# Patient Record
Sex: MALE | Race: WHITE | Employment: OTHER | URBAN - METROPOLITAN AREA
[De-identification: names, ages, dates, MRNs, and addresses within clinical notes are randomized per-mention and may not be internally consistent; named-entity substitution may affect disease eponyms.]

---

## 2017-01-26 ENCOUNTER — OFFICE VISIT (OUTPATIENT)
Dept: ORTHOPEDICS | Facility: CLINIC | Age: 67
End: 2017-01-26
Payer: COMMERCIAL

## 2017-01-26 VITALS — WEIGHT: 193 LBS | HEIGHT: 71 IN | BODY MASS INDEX: 27.02 KG/M2 | TEMPERATURE: 97.9 F

## 2017-01-26 DIAGNOSIS — Z98.890 S/P ARTHROSCOPY OF RIGHT KNEE: Primary | ICD-10-CM

## 2017-01-26 DIAGNOSIS — S83.241A TEAR OF MEDIAL MENISCUS OF RIGHT KNEE, CURRENT, UNSPECIFIED TEAR TYPE, INITIAL ENCOUNTER: ICD-10-CM

## 2017-01-26 PROCEDURE — 99024 POSTOP FOLLOW-UP VISIT: CPT | Performed by: PHYSICIAN ASSISTANT

## 2017-01-26 ASSESSMENT — PAIN SCALES - GENERAL: PAINLEVEL: NO PAIN (1)

## 2017-01-26 NOTE — MR AVS SNAPSHOT
"              After Visit Summary   2017    Aung Sidhu    MRN: [id number]           Patient Information     Date Of Birth          1950        Visit Information        Provider Department      2017 8:00 AM Greg Morgan MD [facility]         Follow-ups after your visit        Who to contact     If you have questions or need follow up information about today's clinic visit or your schedule please contact [facility] directly at [phone].  Normal or non-critical lab and imaging results will be communicated to you by Strikinglyhart, letter or phone within 4 business days after the clinic has received the results. If you do not hear from us within 7 days, please contact the clinic through Strikinglyhart or phone. If you have a critical or abnormal lab result, we will notify you by phone as soon as possible.  Submit refill requests through [employer] or call your pharmacy and they will forward the refill request to us. Please allow 3 business days for your refill to be completed.          Additional Information About Your Visit        [employer] Information     [employer] lets you send messages to your doctor, view your test results, renew your prescriptions, schedule appointments and more. To sign up, go to [email or web address] . Click on \"Log in\" on the left side of the screen, which will take you to the Welcome page. Then click on \"Sign up Now\" on the right side of the page.     You will be asked to enter the access code listed below, as well as some personal information. Please follow the directions to create your username and password.     Your access code is: [email or web address]  Expires: 3/6/2017  9:14 AM     Your access code will  in 90 days. If you need help or a new code, please call your [facility] or [phone].        Care EveryWhere ID     This is your Care EveryWhere ID. This could be used by other organizations to access your [place] medical " "records  [id number]        Your Vitals Were     Temperature Height BMI (Body Mass Index)             97.9  F (36.6  C) (Temporal) 1.806 m (5' 11.1\") 26.84 kg/m2          Blood Pressure from Last 3 Encounters:   12/15/16 135/95   12/12/16 130/80   11/25/16 122/82    Weight from Last 3 Encounters:   01/26/17 87.544 kg (193 lb)   12/27/16 84.823 kg (187 lb)   12/15/16 83.915 kg (185 lb)              Today, you had the following     No orders found for display       Primary Care Provider Office Phone # Fax #    Delfino Ferguson [phone]       [facility] [address] [place] [address]        Thank you!     Thank you for choosing [facility]  for your care. Our goal is always to provide you with excellent care. Hearing back from our patients is one way we can continue to improve our services. Please take a few minutes to complete the written survey that you may receive in the mail after your visit with us. Thank you!             Your Updated Medication List - Protect others around you: Learn how to safely use, store and throw away your medicines at www.disposemymeds.org.          This list is accurate as of: 1/26/17  8:01 AM.  Always use your most recent med list.                   Brand Name Dispense Instructions for use    augmented betamethasone dipropionate 0.05 % cream    DIPROLENE-AF    50 g    Apply sparingly to affected area twice daily as needed.  Do not apply to face.       lisinopril 10 MG tablet    PRINIVIL/ZESTRIL    90 tablet    Take 1 tablet (10 mg) by mouth daily         "

## 2017-01-26 NOTE — PROGRESS NOTES
"HISTORY OF PRESENT ILLNESS:    Aung Sidhu is a 66 year old male who is seen in follow up for   Chief Complaint   Patient presents with     RECHECK     Right  knee arthroscopy with medial meniscus debridement.  DOS: 12/15/16 (6 weeks postop)     Surgical Followup     PREOPERATIVE DIAGNOSIS:Degenerative tear medial meniscus right knee.POSTOPERATIVE DIAGNOSIS:Degenerative tear medial meniscus, right knee with fairly significant patellofemoral chondromalacia, defect of the medial femoral condyle along its medial edge, evidence of tear of the anterior horn lateral meniscus with an entrapped loose body.PROCEDURE:Arthroscopic evaluation with debridement of medial meniscus and assessment of anterior horn of the lateral meniscus with removal loose body and smoothing of the      Surgical Followup     cartilage.Chondral shaving   Interval: Patient reports his knee is getting better with every day. He has been getting around well.  He does describe if he does too much she gets a little additional soreness. Swelling has been minimal.  Patient evaluation done with Dr. Morgan    Physical Exam:  Vitals: Temp(Src) 97.9  F (36.6  C) (Temporal)  Ht 1.806 m (5' 11.1\")  Wt 87.544 kg (193 lb)  BMI 26.84 kg/m2  BMI= Body mass index is 26.84 kg/(m^2).  Constitutional: healthy, alert and no acute distress   Psychiatric: mentation appears normal and affect normal/bright  NEURO: no focal deficits  Location of surgery: right knee  Swelling minimal  Incision sites:  Well-healed  Range of motion: Full extension and full flexion  Quad tone is decreased in the right comparatively to the left. This was pointed out to the patient.     ASSESSMENT:    Chief Complaint   Patient presents with     RECHECK     Right  knee arthroscopy with medial meniscus debridement.  DOS: 12/15/16 (6 weeks postop)     Surgical Followup     PREOPERATIVE DIAGNOSIS:Degenerative tear medial meniscus right knee.POSTOPERATIVE DIAGNOSIS:Degenerative tear medial meniscus, " right knee with fairly significant patellofemoral chondromalacia, defect of the medial femoral condyle along its medial edge, evidence of tear of the anterior horn lateral meniscus with an entrapped loose body.PROCEDURE:Arthroscopic evaluation with debridement of medial meniscus and assessment of anterior horn of the lateral meniscus with removal loose body and smoothing of the      Surgical Followup     cartilage.Chondral shaving       ICD-10-CM    1. S/P arthroscopy of right knee Z98.890    2. Tear of medial meniscus of right knee, current, unspecified tear type, initial encounter S83.241A      Patient is 6 weeks status post right medial And lateral meniscus tear debridement.  Patient is doing well. He does have decreased strength in the right compared to the plate to the left.    Plan:   Physical Therapy: The discrepancy of quad tone was pointed out to the patient. Patient is shown how to do home exercises of 1 leg squats to help  Alleviate the symptom.Patient states core buttocks and thigh exercises concentrated on the leg is what is needed.  Patient advised to utilize continued compression if needed or elevation above the heart level as needed. He has not had too many concerns with this.  Return to clinic As needed for concerns    Danisha Patten PA-C   1/26/2017  9:57 AM      I attest I have seen and evaluated the patient.  I agree with above impression and plan.    Greg Morgan MD

## 2017-01-26 NOTE — NURSING NOTE
"Chief Complaint   Patient presents with     RECHECK     Right  knee arthroscopy with medial meniscus debridement.  DOS: 12/15/16 (6 weeks postop)     Surgical Followup     PREOPERATIVE DIAGNOSIS:Degenerative tear medial meniscus right knee.POSTOPERATIVE DIAGNOSIS:Degenerative tear medial meniscus, right knee with fairly significant patellofemoral chondromalacia, defect of the medial femoral condyle along its medial edge, evidence of tear of the anterior horn lateral meniscus with an entrapped loose body.PROCEDURE:Arthroscopic evaluation with debridement of medial meniscus and assessment of anterior horn of the lateral meniscus with removal loose body and smoothing of the      Surgical Followup     cartilage.Chondral shaving       Initial Ht 1.806 m (5' 11.1\")  Wt 87.544 kg (193 lb)  BMI 26.84 kg/m2 Estimated body mass index is 26.84 kg/(m^2) as calculated from the following:    Height as of this encounter: 1.806 m (5' 11.1\").    Weight as of this encounter: 87.544 kg (193 lb).  BP completed using cuff size: NA (Not Taken)  MIL Valiente  "

## 2017-03-30 ENCOUNTER — RADIANT APPOINTMENT (OUTPATIENT)
Dept: GENERAL RADIOLOGY | Facility: CLINIC | Age: 67
End: 2017-03-30
Attending: PHYSICIAN ASSISTANT
Payer: COMMERCIAL

## 2017-03-30 ENCOUNTER — OFFICE VISIT (OUTPATIENT)
Dept: FAMILY MEDICINE | Facility: CLINIC | Age: 67
End: 2017-03-30
Payer: COMMERCIAL

## 2017-03-30 VITALS
WEIGHT: 187.8 LBS | BODY MASS INDEX: 26.12 KG/M2 | HEART RATE: 72 BPM | TEMPERATURE: 97.1 F | SYSTOLIC BLOOD PRESSURE: 128 MMHG | DIASTOLIC BLOOD PRESSURE: 80 MMHG | OXYGEN SATURATION: 100 %

## 2017-03-30 DIAGNOSIS — Z11.59 NEED FOR HEPATITIS C SCREENING TEST: ICD-10-CM

## 2017-03-30 DIAGNOSIS — J20.9 ACUTE BRONCHITIS, UNSPECIFIED ORGANISM: ICD-10-CM

## 2017-03-30 DIAGNOSIS — Z23 NEED FOR VACCINATION: ICD-10-CM

## 2017-03-30 DIAGNOSIS — Z71.89 COUNSELING REGARDING ADVANCED DIRECTIVES: ICD-10-CM

## 2017-03-30 DIAGNOSIS — R05.9 COUGH: ICD-10-CM

## 2017-03-30 DIAGNOSIS — R05.9 COUGH: Primary | ICD-10-CM

## 2017-03-30 DIAGNOSIS — C61 MALIGNANT NEOPLASM OF PROSTATE (H): ICD-10-CM

## 2017-03-30 DIAGNOSIS — I10 BENIGN ESSENTIAL HYPERTENSION: ICD-10-CM

## 2017-03-30 PROCEDURE — 90670 PCV13 VACCINE IM: CPT | Performed by: PHYSICIAN ASSISTANT

## 2017-03-30 PROCEDURE — G0009 ADMIN PNEUMOCOCCAL VACCINE: HCPCS | Performed by: PHYSICIAN ASSISTANT

## 2017-03-30 PROCEDURE — 86803 HEPATITIS C AB TEST: CPT | Performed by: PHYSICIAN ASSISTANT

## 2017-03-30 PROCEDURE — 36415 COLL VENOUS BLD VENIPUNCTURE: CPT | Performed by: PHYSICIAN ASSISTANT

## 2017-03-30 PROCEDURE — 71020 XR CHEST 2 VW: CPT

## 2017-03-30 PROCEDURE — 99214 OFFICE O/P EST MOD 30 MIN: CPT | Mod: 25 | Performed by: PHYSICIAN ASSISTANT

## 2017-03-30 RX ORDER — AZITHROMYCIN 250 MG/1
TABLET, FILM COATED ORAL
Qty: 6 TABLET | Refills: 0 | Status: SHIPPED | OUTPATIENT
Start: 2017-03-30 | End: 2017-11-03

## 2017-03-30 NOTE — NURSING NOTE
Screening Questionnaire for Adult Immunization    Are you sick today?   No   Do you have allergies to medications, food, a vaccine component or latex?   No   Have you ever had a serious reaction after receiving a vaccination?   No   Do you have a long-term health problem with heart disease, lung disease, asthma, kidney disease, metabolic disease (e.g. diabetes), anemia, or other blood disorder?   No   Do you have cancer, leukemia, HIV/AIDS, or any other immune system problem?   No   In the past 3 months, have you taken medications that affect  your immune system, such as prednisone, other steroids, or anticancer drugs; drugs for the treatment of rheumatoid arthritis, Crohn s disease, or psoriasis; or have you had radiation treatments?   No   Have you had a seizure, or a brain or other nervous system problem?   No   During the past year, have you received a transfusion of blood or blood     products, or been given immune (gamma) globulin or antiviral drug?   No   For women: Are you pregnant or is there a chance you could become        pregnant during the next month?   No   Have you received any vaccinations in the past 4 weeks?   No     Immunization questionnaire answers were all negative.      MNVFC doesn't apply on this patient    Per orders of standing orders, injection of Prevnar given by Ramya Santiago. Patient instructed to remain in clinic for 20 minutes afterwards, and to report any adverse reaction to me immediately.       Screening performed by Ramya Santiago on 3/30/2017 at 11:07 AM.

## 2017-03-30 NOTE — PROGRESS NOTES
Please send the following letter to the patient:    Aung,    Your chest x-ray is normal.    Please call me with any questions or concerns.          Mariama Pitt PA-C

## 2017-03-30 NOTE — LETTER
[facility]  [address] [address] [address]  [phone]        March 31, 2017      Aung Sidhu  [address] [address] [address]        Aung,    Your hepatitis C screen is negative.      Results for orders placed or performed in visit on 03/30/17   **Hepatitis C Screen Reflex to RNA FUTURE anytime   Result Value Ref Range    Hepatitis C Antibody  NR     Nonreactive   Assay performance characteristics have not been established for newborns,   infants, and children           Please call me with any questions or concerns.          Mariama Pitt PA-C/caroline

## 2017-03-30 NOTE — NURSING NOTE
"Chief Complaint   Patient presents with     Throat Problem       Initial BP (!) 156/96  Pulse 72  Temp 97.1  F (36.2  C) (Oral)  Wt 187 lb 12.8 oz (85.2 kg)  SpO2 100%  BMI 26.12 kg/m2 Estimated body mass index is 26.12 kg/(m^2) as calculated from the following:    Height as of 1/26/17: 5' 11.1\" (1.806 m).    Weight as of this encounter: 187 lb 12.8 oz (85.2 kg).  Medication Reconciliation: complete   Ramya Santiago MA      "

## 2017-03-30 NOTE — MR AVS SNAPSHOT
"              After Visit Summary   3/30/2017    Aung Sidhu    MRN: [id number]           Patient Information     Date Of Birth          1950        Visit Information        Provider Department      3/30/2017 10:20 AM Mariama Pitt PA-C [facility]        Today's Diagnoses     Cough    -  1    Counseling regarding advanced directives        Need for hepatitis C screening test        Need for vaccination        Acute bronchitis, unspecified organism        Malignant neoplasm of prostate (H)        Benign essential hypertension           Follow-ups after your visit        Who to contact     Normal or non-critical lab and imaging results will be communicated to you by [employer], letter or phone within 4 business days after the clinic has received the results. If you do not hear from us within 7 days, please contact the clinic through [employer] or phone. If you have a critical or abnormal lab result, we will notify you by phone as soon as possible.  Submit refill requests through [employer] or call your pharmacy and they will forward the refill request to us. Please allow 3 business days for your refill to be completed.          If you need to speak with a  for additional information , please call: [phone]             Additional Information About Your Visit        [employer] Information     [employer] lets you send messages to your doctor, view your test results, renew your prescriptions, schedule appointments and more. To sign up, go to [email or web address] . Click on \"Log in\" on the left side of the screen, which will take you to the Welcome page. Then click on \"Sign up Now\" on the right side of the page.     You will be asked to enter the access code listed below, as well as some personal information. Please follow the directions to create your username and password.     Your access code is: [email or web address]  Expires: 2017 10:48 AM     Your access code will  in 90 days. " If you need help or a new code, please call your [place] clinic or [phone].        Care EveryWhere ID     This is your Care EveryWhere ID. This could be used by other organizations to access your [place] medical records  [id number]        Your Vitals Were     Pulse Temperature Pulse Oximetry BMI (Body Mass Index)          72 97.1  F (36.2  C) (Oral) 100% 26.12 kg/m2         Blood Pressure from Last 3 Encounters:   03/30/17 (!) 156/96   12/15/16 (!) 135/95   12/12/16 130/80    Weight from Last 3 Encounters:   03/30/17 187 lb 12.8 oz (85.2 kg)   01/26/17 193 lb (87.5 kg)   12/27/16 187 lb (84.8 kg)              We Performed the Following     **Hepatitis C Screen Reflex to RNA FUTURE anytime     ADMIN: Vaccine, Initial (23451)     Pneumococcal vaccine 13 valent PCV13 IM (Prevnar) [31872]          Today's Medication Changes          These changes are accurate as of: 3/30/17 10:48 AM.  If you have any questions, ask your nurse or doctor.               Start taking these medicines.        Dose/Directions    azithromycin 250 MG tablet   Commonly known as:  ZITHROMAX   Used for:  Acute bronchitis, unspecified organism   Started by:  Mariama Pitt PA-C        Two tablets first day, then one tablet daily for four days.   Quantity:  6 tablet   Refills:  0            Where to get your medicines      These medications were sent to [place] [facility] - [address] [address]  [address] [address] [address]     Phone:  [phone]     azithromycin 250 MG tablet                Primary Care Provider Office Phone # Fax #    Delfino Ferguson [phone]       [place] [address] [place] [address]        Thank you!     Thank you for choosing [facility]  for your care. Our goal is always to provide you with excellent care. Hearing back from our patients is one way we can continue to improve our services. Please take a few minutes  to complete the written survey that you may receive in the mail after your visit with us. Thank you!             Your Updated Medication List - Protect others around you: Learn how to safely use, store and throw away your medicines at www.disposemymeds.org.          This list is accurate as of: 3/30/17 10:48 AM.  Always use your most recent med list.                   Brand Name Dispense Instructions for use    azithromycin 250 MG tablet    ZITHROMAX    6 tablet    Two tablets first day, then one tablet daily for four days.       lisinopril 10 MG tablet    PRINIVIL/ZESTRIL    90 tablet    Take 1 tablet (10 mg) by mouth daily

## 2017-03-30 NOTE — LETTER
[facility]  [address] [address]  [phone]        March 30, 2017      Aung Sidhu  [address] [address] [address]        Dear Aung,      Your chest x-ray is normal.       Results for orders placed or performed in visit on 03/30/17   XR Chest 2 Views    Narrative    XR CHEST 2 VW 3/30/2017 10:56 AM    HISTORY: Cough.    COMPARISON: None.    FINDINGS: No airspace consolidation, pleural effusion or pneumothorax.  Normal heart size.      Impression    IMPRESSION: No acute cardiopulmonary abnormality.    TORREY LEY MD       If you have any questions or concerns, please call myself or my nurse at [phone].    Sincerely,      Mariama Pitt PA-C/estephaniao

## 2017-03-30 NOTE — PROGRESS NOTES
SUBJECTIVE:                                                    Aung Sidhu is a 66 year old male who presents to clinic today for the following health issues:    Mass   On back  x1 month    ENT Symptoms             Symptoms: cc Present Absent Comment   Fever/Chills   x    Fatigue   x    Muscle Aches   x    Eye Irritation   x    Sneezing  x  Allergies    Nasal John/Drg   x    Sinus Pressure/Pain   x    Loss of smell   x    Dental pain   x    Sore Throat  x  Thinks this is related to surgery December 2016   Swollen Glands   x    Ear Pain/Fullness   x    Cough  x     Wheeze  x     Chest Pain  x     Shortness of breath   x    Rash   x    Other   x      Symptom duration:  x3 months - thinks throat is related to surgery    Symptom severity:  moderate   Treatments tried:  none   Contacts:  none      Patient states he has had bronchitis before and with those episodes has coughed up green phlegm   Coughs hourly  No hx of asthma, former smoker though      Problem list and histories reviewed & adjusted, as indicated.  Additional history: as documented    Patient Active Problem List   Diagnosis     Disorder of bilirubin excretion     Malignant neoplasm of prostate (H)     Counseling regarding advanced directives     Benign essential hypertension     S/P arthroscopy of right knee     Past Surgical History:   Procedure Laterality Date     ARTHROSCOPY KNEE WITH DEBRIDEMENT JOINT, COMBINED Right 12/15/2016    Procedure: ARTHROSCOPY KNEE WITH DEBRIDEMENT JOINT;  Surgeon: Greg Morgan MD;  Location: [facility] OR     C REMV PROSTATE,RETROPUB,RADICAL  06/27/2007    [facility]     HC COLONOSCOPY W/WO BRUSH/WASH  05/19/08     HC INTERSTITIAL RAD SOURCE JESUS ALBERTO, INTERMEDIATE  1998    ganglian cyst     HC REMOVAL OF TONSILS,<12 Y/O      Tonsils <13y.o.     HC TYMPANOPLASTY W/O MASTOID, INIT/REV W/O OSS CHAIN RECONST  1991       Social History   Substance Use Topics     Smoking status: Former Smoker     Smokeless tobacco: Never  Used      Comment: quit in 1986     Alcohol use Yes      Comment: rare     Family History   Problem Relation Age of Onset     Hypertension Mother      Allergies Mother      seasonal     Anesthesia Reaction Mother      n and v     Arthritis Mother      Breast Cancer Mother      Eye Disorder Mother      cataract     Lipids Mother      Obesity Mother      Genitourinary Problems Father      kidneys stopped working, on dialaysis     Allergies Father      seasonal     Alcohol/Drug Father      CEREBROVASCULAR DISEASE Paternal Grandmother            Reviewed and updated as needed this visit by clinical staff  Tobacco  Allergies  Meds       Reviewed and updated as needed this visit by Provider         ROS:  Constitutional, HEENT, cardiovascular, pulmonary, gi and gu systems are negative, except as otherwise noted.    OBJECTIVE:                                                    /80  Pulse 72  Temp 97.1  F (36.2  C) (Oral)  Wt 187 lb 12.8 oz (85.2 kg)  SpO2 100%  BMI 26.12 kg/m2  Body mass index is 26.12 kg/(m^2).  GENERAL APPEARANCE: healthy, alert and no distress  EYES: Eyes grossly normal to inspection and conjunctivae and sclerae normal  RESP: lungs clear to auscultation - no rales, rhonchi or wheezes  CV: regular rates and rhythm, normal S1 S2, no S3 or S4, no murmur, click or rub and no irregular beats  PSYCH: mentation appears normal and affect normal/bright    Diagnostic test results:  Diagnostic Test Results:  CXR - negative     ASSESSMENT:                                                      1. Cough    2. Counseling regarding advanced directives    3. Need for hepatitis C screening test    4. Need for vaccination    5. Acute bronchitis, unspecified organism    6. Malignant neoplasm of prostate (H)    7. Benign essential hypertension         PLAN:                                                    CXR appears clear. Will treat for bacterial bronchitis with azithro. Follow up if symptoms fail to improve  or worsen.     Patient sees his oncologist once yearly in [place] for his history of prostate cancer.    The patient was in agreement with the plan today and had no questions or concerns prior to leaving the clinic.     Mariama Pitt PA-C  [facility]

## 2017-03-31 LAB — HCV AB SERPL QL IA: NORMAL

## 2017-03-31 NOTE — PROGRESS NOTES
Please send the following letter to the patient:    Aung,    Your hepatitis C screen is negative.    Please call me with any questions or concerns.          Mariama Pitt PA-C

## 2017-11-03 ENCOUNTER — OFFICE VISIT (OUTPATIENT)
Dept: FAMILY MEDICINE | Facility: CLINIC | Age: 67
End: 2017-11-03
Payer: COMMERCIAL

## 2017-11-03 VITALS
RESPIRATION RATE: 18 BRPM | HEART RATE: 82 BPM | TEMPERATURE: 98.3 F | HEIGHT: 71 IN | BODY MASS INDEX: 25.87 KG/M2 | SYSTOLIC BLOOD PRESSURE: 130 MMHG | DIASTOLIC BLOOD PRESSURE: 76 MMHG | OXYGEN SATURATION: 97 % | WEIGHT: 184.8 LBS

## 2017-11-03 DIAGNOSIS — Z13.6 SCREENING FOR CARDIOVASCULAR CONDITION: ICD-10-CM

## 2017-11-03 DIAGNOSIS — M19.90 ARTHRITIS: ICD-10-CM

## 2017-11-03 DIAGNOSIS — R05.9 COUGH: ICD-10-CM

## 2017-11-03 DIAGNOSIS — Z12.5 SCREENING FOR PROSTATE CANCER: ICD-10-CM

## 2017-11-03 DIAGNOSIS — I10 BENIGN ESSENTIAL HYPERTENSION: Primary | ICD-10-CM

## 2017-11-03 LAB
ALBUMIN SERPL-MCNC: 3.7 G/DL (ref 3.4–5)
ALP SERPL-CCNC: 95 U/L (ref 40–150)
ALT SERPL W P-5'-P-CCNC: 40 U/L (ref 0–70)
ANION GAP SERPL CALCULATED.3IONS-SCNC: 7 MMOL/L (ref 3–14)
AST SERPL W P-5'-P-CCNC: 19 U/L (ref 0–45)
BILIRUB SERPL-MCNC: 1.1 MG/DL (ref 0.2–1.3)
BUN SERPL-MCNC: 12 MG/DL (ref 7–30)
CALCIUM SERPL-MCNC: 8.9 MG/DL (ref 8.5–10.1)
CHLORIDE SERPL-SCNC: 101 MMOL/L (ref 94–109)
CHOLEST SERPL-MCNC: 161 MG/DL
CO2 SERPL-SCNC: 27 MMOL/L (ref 20–32)
CREAT SERPL-MCNC: 1.14 MG/DL (ref 0.66–1.25)
GFR SERPL CREATININE-BSD FRML MDRD: 64 ML/MIN/1.7M2
GLUCOSE SERPL-MCNC: 106 MG/DL (ref 70–99)
HDLC SERPL-MCNC: 40 MG/DL
LDLC SERPL CALC-MCNC: 78 MG/DL
NONHDLC SERPL-MCNC: 121 MG/DL
POTASSIUM SERPL-SCNC: 4.6 MMOL/L (ref 3.4–5.3)
PROT SERPL-MCNC: 7.7 G/DL (ref 6.8–8.8)
PSA SERPL-ACNC: <0.01 UG/L (ref 0–4)
SODIUM SERPL-SCNC: 135 MMOL/L (ref 133–144)
TRIGL SERPL-MCNC: 217 MG/DL

## 2017-11-03 PROCEDURE — G0103 PSA SCREENING: HCPCS | Performed by: FAMILY MEDICINE

## 2017-11-03 PROCEDURE — 86618 LYME DISEASE ANTIBODY: CPT | Performed by: FAMILY MEDICINE

## 2017-11-03 PROCEDURE — 80061 LIPID PANEL: CPT | Performed by: FAMILY MEDICINE

## 2017-11-03 PROCEDURE — 99214 OFFICE O/P EST MOD 30 MIN: CPT | Performed by: FAMILY MEDICINE

## 2017-11-03 PROCEDURE — 36415 COLL VENOUS BLD VENIPUNCTURE: CPT | Performed by: FAMILY MEDICINE

## 2017-11-03 PROCEDURE — 80053 COMPREHEN METABOLIC PANEL: CPT | Performed by: FAMILY MEDICINE

## 2017-11-03 RX ORDER — LISINOPRIL 10 MG/1
10 TABLET ORAL DAILY
Qty: 90 TABLET | Refills: 3 | Status: SHIPPED | OUTPATIENT
Start: 2017-11-03 | End: 2018-11-30

## 2017-11-03 ASSESSMENT — PAIN SCALES - GENERAL: PAINLEVEL: NO PAIN (0)

## 2017-11-03 NOTE — MR AVS SNAPSHOT
"              After Visit Summary   11/3/2017    Aung Sidhu    MRN: [id number]           Patient Information     Date Of Birth          1950        Visit Information        Provider Department      11/3/2017 10:40 AM Delfino Ferguson MD [facility]        Today's Diagnoses     Benign essential hypertension    -  1    Screening for cardiovascular condition        Screening for prostate cancer        Cough        Arthritis           Follow-ups after your visit        Who to contact     If you have questions or need follow up information about today's clinic visit or your schedule please contact [facility] directly at [phone].  Normal or non-critical lab and imaging results will be communicated to you by Tapticahart, letter or phone within 4 business days after the clinic has received the results. If you do not hear from us within 7 days, please contact the clinic through [employer] or phone. If you have a critical or abnormal lab result, we will notify you by phone as soon as possible.  Submit refill requests through [employer] or call your pharmacy and they will forward the refill request to us. Please allow 3 business days for your refill to be completed.          Additional Information About Your Visit        MyChart Information     [employer] lets you send messages to your doctor, view your test results, renew your prescriptions, schedule appointments and more. To sign up, go to [email or web address] . Click on \"Log in\" on the left side of the screen, which will take you to the Welcome page. Then click on \"Sign up Now\" on the right side of the page.     You will be asked to enter the access code listed below, as well as some personal information. Please follow the directions to create your username and password.     Your access code is: [email or web address]  Expires: 2018 11:10 AM     Your access code will  in 90 days. If you need help or a new code, please call your [place] " "clinic or [phone].        Care EveryWhere ID     This is your Care EveryWhere ID. This could be used by other organizations to access your [facility] medical records  [id number]        Your Vitals Were     Pulse Temperature Respirations Height Pulse Oximetry BMI (Body Mass Index)    82 98.3  F (36.8  C) (Temporal) 18 5' 11.1\" (1.806 m) 97% 25.7 kg/m2       Blood Pressure from Last 3 Encounters:   11/03/17 130/76   03/30/17 128/80   12/15/16 (!) 135/95    Weight from Last 3 Encounters:   11/03/17 184 lb 12.8 oz (83.8 kg)   03/30/17 187 lb 12.8 oz (85.2 kg)   01/26/17 193 lb (87.5 kg)              We Performed the Following     Comprehensive metabolic panel (BMP + Alb, Alk Phos, ALT, AST, Total. Bili, TP)     Lipid Profile     Lyme Disease Maddy with reflex to WB Serum     Prostate spec antigen screen          Where to get your medicines      These medications were sent to [address]  [address], [facility] [address]     Phone:  [phone]     lisinopril 10 MG tablet          Primary Care Provider Office Phone # Fax #    Delfino Ferguson [phone]       [facility] [address] [place] [address]        Equal Access to Services     VALENTE LAWSON AH: Hadii aad ku hadasho Soomaali, waaxda luqadaha, qaybta kaalmada adeegyada, deisy ng. So [facility] [phone].    ATENCIÓN: Si habla español, tiene a reich disposición servicios gratuitos de asistencia lingüística. Llame al [phone].    We comply with applicable federal civil rights laws and [place] laws. We do not discriminate on the basis of race, color, national origin, age, disability, sex, sexual orientation, or gender identity.            Thank you!     Thank you for choosing [facility]  for your care. Our goal is always to provide you with excellent care. Hearing back from our patients is one way we can continue to improve our services. " Please take a few minutes to complete the written survey that you may receive in the mail after your visit with us. Thank you!             Your Updated Medication List - Protect others around you: Learn how to safely use, store and throw away your medicines at www.disposemymeds.org.          This list is accurate as of: 11/3/17 11:10 AM.  Always use your most recent med list.                   Brand Name Dispense Instructions for use Diagnosis    lisinopril 10 MG tablet    PRINIVIL/ZESTRIL    90 tablet    Take 1 tablet (10 mg) by mouth daily    Benign essential hypertension

## 2017-11-03 NOTE — NURSING NOTE
"Chief Complaint   Patient presents with     Hypertension       Initial /76 (BP Location: Right arm, Patient Position: Chair, Cuff Size: Adult Regular)  Pulse 82  Temp 98.3  F (36.8  C) (Temporal)  Resp 18  Ht 5' 11.1\" (1.806 m)  Wt 184 lb 12.8 oz (83.8 kg)  SpO2 97%  BMI 25.7 kg/m2 Estimated body mass index is 25.7 kg/(m^2) as calculated from the following:    Height as of this encounter: 5' 11.1\" (1.806 m).    Weight as of this encounter: 184 lb 12.8 oz (83.8 kg).  Medication Reconciliation: complete   Shikha/MIL     "

## 2017-11-03 NOTE — PROGRESS NOTES
SUBJECTIVE:   Aung Sidhu is a 67 year old male who presents to clinic today for the following health issues:      Hypertension Follow-up      Outpatient blood pressures are being checked at home.  Results are good.    Low Salt Diet: no added salt          Amount of exercise or physical activity: occ    Problems taking medications regularly: No    Medication side effects: none    Diet: regular (no restrictions)        Surgery in Dec for knee, coughing up phelgm since then.  Can he take allergy medication? Seems to help    Eye issue- feels like he is seeing floaters.  Last eye exam was 2 years.     Problem list and histories reviewed & adjusted, as indicated.  Additional history:         Reviewed and updated as needed this visit by clinical staffTobacco  Allergies  Med Hx  Surg Hx  Fam Hx  Soc Hx      Reviewed and updated as needed this visit by Provider        SUBJECTIVE:  Aung  is a 67 year old male who presents for:  Off of his hypertension. Has concerns of postnasal drip and drainage and is always coughing things up. Noticed this after his ankle surgery and intubation last year. He bought some Benadryl in it does seem to help. Occasionally feels vertigo he has had ear surgery in the past. Another concern was feels like floaters in the eyes. Also complains of some arthropathy and he has had multiple ticks on him during the summer.    Past Medical History:   Diagnosis Date     Malignant neoplasm of prostate (H)      Meniere disease      Motion sickness      PONV (postoperative nausea and vomiting)      Past Surgical History:   Procedure Laterality Date     ARTHROSCOPY KNEE WITH DEBRIDEMENT JOINT, COMBINED Right 12/15/2016    Procedure: ARTHROSCOPY KNEE WITH DEBRIDEMENT JOINT;  Surgeon: Greg Morgan MD;  Location: [facility] OR     C REMV PROSTATE,RETROPUB,RADICAL  06/27/2007    [facility]     HC COLONOSCOPY W/WO BRUSH/WASH  05/19/08     HC INTERSTITIAL RAD SOURCE JESUS ALBERTO, INTERMEDIATE  1998     "ganglian cyst     HC REMOVAL OF TONSILS,<13 Y/O      Tonsils <13y.o.     HC TYMPANOPLASTY W/O MASTOID, INIT/REV W/O OSS CHAIN RECONST  1991     Social History   Substance Use Topics     Smoking status: Former Smoker     Smokeless tobacco: Never Used      Comment: quit in 1986     Alcohol use Yes      Comment: rare     Current Outpatient Prescriptions   Medication Sig Dispense Refill     lisinopril (PRINIVIL/ZESTRIL) 10 MG tablet Take 1 tablet (10 mg) by mouth daily 90 tablet 3     [DISCONTINUED] lisinopril (PRINIVIL,ZESTRIL) 10 MG tablet Take 1 tablet (10 mg) by mouth daily 90 tablet 3       REVIEW OF SYSTEMS:   5 point ROS negative except as noted above in HPI, including Gen., Resp, CV, GI &  system review.     OBJECTIVE:  Vitals: /76 (BP Location: Right arm, Patient Position: Chair, Cuff Size: Adult Regular)  Pulse 82  Temp 98.3  F (36.8  C) (Temporal)  Resp 18  Ht 5' 11.1\" (1.806 m)  Wt 184 lb 12.8 oz (83.8 kg)  SpO2 97%  BMI 25.7 kg/m2  BMI= Body mass index is 25.7 kg/(m^2).  Alert oriented in no distress. Eyes PERRLA. Throat with a little drainage. Neck supple no adenopathy. Lungs are clear to auscultation. Heart regular rhythm. Abdomen with no epigastric tenderness bowel sounds present. Skin clear.    ASSESSMENT:  #1 hypertension #2 chronic cough #3 arthritis    PLAN:  Renew his lisinopril. This cough does not seem to be from this he feels that his phlegm that he is coughing up. It could be a combination of reflux and drainage. I recommended getting some Zyrtec. Also recommended an antiacid. ENT consult would be in order. He wants a Lyme's test for the arthritis and will do that. We will notify him with his test results for hypertension and PSA. Due for colonoscopy next year.        Delfino Ferguson MD  [facility]              "

## 2017-11-04 LAB — B BURGDOR IGG+IGM SER QL: 0.04 (ref 0–0.89)

## 2017-11-16 ENCOUNTER — TELEPHONE (OUTPATIENT)
Dept: FAMILY MEDICINE | Facility: CLINIC | Age: 67
End: 2017-11-16

## 2017-11-16 NOTE — PROGRESS NOTES
Labs show lymes test negative, PSA less than 0.01 same as last year, chemistry panel was all normal your blood sugar was borderline at 106. Cholesterol is good at 161. Triglycerides slightly elevated at 217 but better than last year when it was 303

## 2017-11-17 NOTE — TELEPHONE ENCOUNTER
Aung returned call. I read him the message below and he verbalized understanding. He has no questions at this time.     Thank you. Terence Cuevas, Patient Representative

## 2018-04-05 ENCOUNTER — OFFICE VISIT (OUTPATIENT)
Dept: FAMILY MEDICINE | Facility: CLINIC | Age: 68
End: 2018-04-05
Payer: COMMERCIAL

## 2018-04-05 ENCOUNTER — NURSE TRIAGE (OUTPATIENT)
Dept: NURSING | Facility: CLINIC | Age: 68
End: 2018-04-05

## 2018-04-05 VITALS
BODY MASS INDEX: 26.43 KG/M2 | WEIGHT: 190 LBS | TEMPERATURE: 96.8 F | SYSTOLIC BLOOD PRESSURE: 141 MMHG | DIASTOLIC BLOOD PRESSURE: 90 MMHG | OXYGEN SATURATION: 99 % | HEART RATE: 101 BPM

## 2018-04-05 DIAGNOSIS — L08.9 BACTERIAL SKIN INFECTION: Primary | ICD-10-CM

## 2018-04-05 DIAGNOSIS — B96.89 BACTERIAL SKIN INFECTION: Primary | ICD-10-CM

## 2018-04-05 PROCEDURE — 99213 OFFICE O/P EST LOW 20 MIN: CPT | Performed by: FAMILY MEDICINE

## 2018-04-05 PROCEDURE — 87070 CULTURE OTHR SPECIMN AEROBIC: CPT | Performed by: FAMILY MEDICINE

## 2018-04-05 PROCEDURE — 87077 CULTURE AEROBIC IDENTIFY: CPT | Performed by: FAMILY MEDICINE

## 2018-04-05 RX ORDER — CLINDAMYCIN HCL 300 MG
300 CAPSULE ORAL 4 TIMES DAILY
Qty: 28 CAPSULE | Refills: 0 | Status: SHIPPED | OUTPATIENT
Start: 2018-04-05 | End: 2018-04-12

## 2018-04-05 ASSESSMENT — PAIN SCALES - GENERAL: PAINLEVEL: NO PAIN (0)

## 2018-04-05 NOTE — TELEPHONE ENCOUNTER
Hit the inner corner of the left eye on the garage door 3/31/ hit the emerita portion/ sent for an appointment   Nain Perez RN [phone]  Reason for Disposition    [1] Face wound AND [2] looks infected (e.g., spreading redness)    Additional Information    Negative: [1] Major bleeding (e.g., actively dripping or spurting) AND [2] can't be stopped    Negative: Knocked out (unconscious) > 1 minute    Negative: Difficult to awaken or acting confused  (e.g., disoriented, slurred speech)    Negative: Severe neck pain    Negative: Sounds like a life-threatening emergency to the triager    Wound looks infected    Negative: [1] Widespread rash AND [2] bright red, sunburn-like AND [3] too weak to stand    Negative: Sounds like a life-threatening emergency to the triager    Negative: Stitches (or staples) and  not  infected    Negative: Skin glue used to close wound and not infected    Negative:  surgical wound infection suspected    Negative: Surgical wound infection suspected (post-op)    Negative: Animal bite wound infection suspected    Negative: [1] Widespread rash AND [2] bright red, sunburn-like    Negative: Severe pain in the wound    Negative: Black (necrotic) or blisters develop in wound    Negative: Patient sounds very sick or weak to the triager    Negative: [1] Looks infected (spreading redness, red streak, pus) AND [2] fever    Negative: [1] Red streak runs from the wound AND [2] longer than 1 inch (2.5 cm)    Negative: [1] Skin around the wound has become red AND [2] larger than 2 inches (5 cm)    Protocols used: WOUND INFECTION-ADULT-, EYE INJURY-ADULT-

## 2018-04-05 NOTE — PATIENT INSTRUCTIONS
Take prescribed medication as directed.    Warm, most wash cloth to area at least 4 times daily.    For worsening redness, swelling, pain, return for re check.

## 2018-04-05 NOTE — PROGRESS NOTES
Lesion over left eye - itchy - spreading - draining - x 1 week      HISTORY    GERD and had a small itching area of skin above left eyebrow a week ago.  Now the area is crusted and there is surrounding redness and he has some eyelid swelling.  Slight clear drainage.  He has not noticed fever.    He is not a diabetic.  He has a history of prostate cancer but his PSA is near 0.    Patient Active Problem List   Diagnosis     Disorder of bilirubin excretion     Malignant neoplasm of prostate (H)     Counseling regarding advanced directives     Benign essential hypertension     S/P arthroscopy of right knee       REVIEW OF SYSTEMS    No eye pain.  No headache.      Past Medical History:   Diagnosis Date     Malignant neoplasm of prostate (H)      Meniere disease      Motion sickness      PONV (postoperative nausea and vomiting)        EXAM  /90  Pulse 101  Temp 96.8  F (36  C) (Oral)  Wt 190 lb (86.2 kg)  SpO2 99%  BMI 26.43 kg/m2    There is an area of redness about 2 x 3 cm above left eyebrow.  Centrally in this there is a 2 cm area of yellow brown crusting.  He has some edema of the eyelids of the left eye, mild to moderate amount.    I did obtain a wound culture of the crusted region.      (L08.9,  B96.89) Bacterial skin infection  (primary encounter diagnosis)  Comment:     This looks like a bacterial skin infection.  There is some mild surrounding cellulitis.    Plan: clindamycin (CLEOCIN) 300 MG capsule, Wound         Culture Aerobic Bacterial            Take prescribed medication as directed.    Warm, most wash cloth to area at least 4 times daily.    For worsening redness, swelling, pain, return for re check.

## 2018-04-05 NOTE — MR AVS SNAPSHOT
"              After Visit Summary   2018    Aung Sidhu    MRN: [id number]           Patient Information     Date Of Birth          1950        Visit Information        Provider Department      2018 12:40 PM Foster Tai MD [facility]        Today's Diagnoses     Bacterial skin infection    -  1      Care Instructions      Take prescribed medication as directed.    Warm, most wash cloth to area at least 4 times daily.    For worsening redness, swelling, pain, return for re check.          Follow-ups after your visit        Who to contact     If you have questions or need follow up information about today's clinic visit or your schedule please contact [facility] directly at [phone].  Normal or non-critical lab and imaging results will be communicated to you by MyChart, letter or phone within 4 business days after the clinic has received the results. If you do not hear from us within 7 days, please contact the clinic through MyChart or phone. If you have a critical or abnormal lab result, we will notify you by phone as soon as possible.  Submit refill requests through [employer] or call your pharmacy and they will forward the refill request to us. Please allow 3 business days for your refill to be completed.          Additional Information About Your Visit        MyChart Information     [employer] lets you send messages to your doctor, view your test results, renew your prescriptions, schedule appointments and more. To sign up, go to [email or web address] . Click on \"Log in\" on the left side of the screen, which will take you to the Welcome page. Then click on \"Sign up Now\" on the right side of the page.     You will be asked to enter the access code listed below, as well as some personal information. Please follow the directions to create your username and password.     Your access code is: [email or web address]  Expires: 2018 12:53 PM     Your access code will  in " 90 days. If you need help or a new code, please call your [place] clinic or [phone].        Care EveryWhere ID     This is your Care EveryWhere ID. This could be used by other organizations to access your [place] medical records  [id number]        Your Vitals Were     Pulse Temperature Pulse Oximetry BMI (Body Mass Index)          101 96.8  F (36  C) (Oral) 99% 26.43 kg/m2         Blood Pressure from Last 3 Encounters:   04/05/18 141/90   11/03/17 130/76   03/30/17 128/80    Weight from Last 3 Encounters:   04/05/18 190 lb (86.2 kg)   11/03/17 184 lb 12.8 oz (83.8 kg)   03/30/17 187 lb 12.8 oz (85.2 kg)              We Performed the Following     Wound Culture Aerobic Bacterial          Today's Medication Changes          These changes are accurate as of 4/5/18 12:54 PM.  If you have any questions, ask your nurse or doctor.               Start taking these medicines.        Dose/Directions    clindamycin 300 MG capsule   Commonly known as:  CLEOCIN   Used for:  Bacterial skin infection   Started by:  Foster Tai MD        Dose:  300 mg   Take 1 capsule (300 mg) by mouth 4 times daily for 7 days   Quantity:  28 capsule   Refills:  0            Where to get your medicines      These medications were sent to [facility] [address], [facility] [address]  [address] [place] [address]     Phone:  [phone]     clindamycin 300 MG capsule                Primary Care Provider Office Phone # Fax #    Delfino Ferguson [phone]       [address] [facility] [address]        Equal Access to Services     [facility] : Antionette Villarreal, myrna lizarraga, deisy briscoe. So [facility] [phone].    ATENCIÓN: Si habla español, tiene a reich disposición servicios gratuitos de asistencia lingüística. Llame al [phone].    We comply with applicable federal civil rights laws and  [place] laws. We do not discriminate on the basis of race, color, national origin, age, disability, sex, sexual orientation, or gender identity.            Thank you!     Thank you for choosing [facility]  [facility]. Our goal is always to provide you with excellent care. Hearing back from our patients is one way we can continue to improve our services. Please take a few minutes to complete the written survey that you may receive in the mail after your visit with us. Thank you!             Your Updated Medication List - Protect others around you: Learn how to safely use, store and throw away your medicines at www.disposemymeds.org.          This list is accurate as of 4/5/18 12:54 PM.  Always use your most recent med list.                   Brand Name Dispense Instructions for use Diagnosis    clindamycin 300 MG capsule    CLEOCIN    28 capsule    Take 1 capsule (300 mg) by mouth 4 times daily for 7 days    Bacterial skin infection       lisinopril 10 MG tablet    PRINIVIL/ZESTRIL    90 tablet    Take 1 tablet (10 mg) by mouth daily    Benign essential hypertension

## 2018-04-07 LAB
BACTERIA SPEC CULT: ABNORMAL
BACTERIA SPEC CULT: ABNORMAL
SPECIMEN SOURCE: ABNORMAL

## 2018-06-08 DIAGNOSIS — I10 BENIGN ESSENTIAL HYPERTENSION: ICD-10-CM

## 2018-06-08 RX ORDER — LISINOPRIL 10 MG/1
TABLET ORAL
Qty: 90 TABLET | Refills: 3 | OUTPATIENT
Start: 2018-06-08

## 2018-06-08 NOTE — TELEPHONE ENCOUNTER
"Requested Prescriptions   Pending Prescriptions Disp Refills     lisinopril (PRINIVIL/ZESTRIL) 10 MG tablet [Pharmacy Med Name: LISINOPRIL 10MG TABS] 90 tablet 3     Sig: TAKE 1 TABLET BY MOUTH DAILY.    ACE Inhibitors (Including Combos) Protocol Failed    6/8/2018  8:12 AM       Failed - Blood pressure under 140/90 in past 12 months    BP Readings from Last 3 Encounters:   04/05/18 141/90   11/03/17 130/76   03/30/17 128/80                Passed - Recent (12 mo) or future (30 days) visit within the authorizing provider's specialty    Patient had office visit in the last 12 months or has a visit in the next 30 days with authorizing provider or within the authorizing provider's specialty.  See \"Patient Info\" tab in inbasket, or \"Choose Columns\" in Meds & Orders section of the refill encounter.           Passed - Patient is age 18 or older       Passed - Normal serum creatinine on file in past 12 months    Recent Labs   Lab Test  11/03/17   1108   CR  1.14            Passed - Normal serum potassium on file in past 12 months    Recent Labs   Lab Test  11/03/17   1108   POTASSIUM  4.6               Last Written Prescription Date:  11/3/17  Last Fill Quantity: 90,  # refills: 3   Last Office Visit with G, P or [facility] prescribing provider:  11/3/17   Future Office Visit:       "

## 2018-08-15 ENCOUNTER — OFFICE VISIT (OUTPATIENT)
Dept: FAMILY MEDICINE | Facility: CLINIC | Age: 68
End: 2018-08-15
Payer: COMMERCIAL

## 2018-08-15 VITALS
DIASTOLIC BLOOD PRESSURE: 74 MMHG | TEMPERATURE: 97.8 F | SYSTOLIC BLOOD PRESSURE: 136 MMHG | OXYGEN SATURATION: 94 % | HEART RATE: 76 BPM | RESPIRATION RATE: 16 BRPM | BODY MASS INDEX: 25.89 KG/M2 | WEIGHT: 184.9 LBS | HEIGHT: 71 IN

## 2018-08-15 DIAGNOSIS — I10 BENIGN ESSENTIAL HYPERTENSION: ICD-10-CM

## 2018-08-15 DIAGNOSIS — Z12.11 SCREENING FOR COLON CANCER: ICD-10-CM

## 2018-08-15 DIAGNOSIS — Z00.00 ROUTINE GENERAL MEDICAL EXAMINATION AT A HEALTH CARE FACILITY: Primary | ICD-10-CM

## 2018-08-15 DIAGNOSIS — C61 MALIGNANT NEOPLASM OF PROSTATE (H): ICD-10-CM

## 2018-08-15 PROCEDURE — 99397 PER PM REEVAL EST PAT 65+ YR: CPT | Performed by: FAMILY MEDICINE

## 2018-08-15 NOTE — PATIENT INSTRUCTIONS
Preventive Health Recommendations:   Male Ages 65 and over    Yearly exam:             See your health care provider every year in order to  o   Review health changes.   o   Discuss preventive care.    o   Review your medicines if your doctor has prescribed any.    Talk with your health care provider about whether you should have a test to screen for prostate cancer (PSA).    Every 3 years, have a diabetes test (fasting glucose). If you are at risk for diabetes, you should have this test more often.    Every 5 years, have a cholesterol test. Have this test more often if you are at risk for high cholesterol or heart disease.     Every 10 years, have a colonoscopy. Or, have a yearly FIT test (stool test). These exams will check for colon cancer.    Talk to with your health care provider about screening for Abdominal Aortic Aneurysm if you have a family history of AAA or have a history of smoking.    Shots:     Get a flu shot each year.     Get a tetanus shot every 10 years.     Talk to your doctor about your pneumonia vaccines. There are now two you should receive - Pneumovax (PPSV 23) and Prevnar (PCV 13).     Talk to your pharmacist about a shingles vaccine.     Talk to your doctor about the hepatitis B vaccine.  Nutrition:     Eat at least 5 servings of fruits and vegetables each day.     Eat whole-grain bread, whole-wheat pasta and brown rice instead of white grains and rice.     Get adequate Calcium and Vitamin D.   Lifestyle    Exercise for at least 150 minutes a week (30 minutes a day, 5 days a week). This will help you control your weight and prevent disease.     Limit alcohol to one drink per day.     No smoking.     Wear sunscreen to prevent skin cancer.     See your dentist every six months for an exam and cleaning.     See your eye doctor every 1 to 2 years to screen for conditions such as glaucoma, macular degeneration, cataracts, etc     Preventive Health Recommendations:   Male Ages 65 and  over    Yearly exam:             See your health care provider every year in order to  o   Review health changes.   o   Discuss preventive care.    o   Review your medicines if your doctor has prescribed any.    Talk with your health care provider about whether you should have a test to screen for prostate cancer (PSA).    Every 3 years, have a diabetes test (fasting glucose). If you are at risk for diabetes, you should have this test more often.    Every 5 years, have a cholesterol test. Have this test more often if you are at risk for high cholesterol or heart disease.     Every 10 years, have a colonoscopy. Or, have a yearly FIT test (stool test). These exams will check for colon cancer.    Talk to with your health care provider about screening for Abdominal Aortic Aneurysm if you have a family history of AAA or have a history of smoking.    Shots:     Get a flu shot each year.     Get a tetanus shot every 10 years.     Talk to your doctor about your pneumonia vaccines. There are now two you should receive - Pneumovax (PPSV 23) and Prevnar (PCV 13).     Talk to your pharmacist about a shingles vaccine.     Talk to your doctor about the hepatitis B vaccine.  Nutrition:     Eat at least 5 servings of fruits and vegetables each day.     Eat whole-grain bread, whole-wheat pasta and brown rice instead of white grains and rice.     Get adequate Calcium and Vitamin D.   Lifestyle    Exercise for at least 150 minutes a week (30 minutes a day, 5 days a week). This will help you control your weight and prevent disease.     Limit alcohol to one drink per day.     No smoking.     Wear sunscreen to prevent skin cancer.     See your dentist every six months for an exam and cleaning.     See your eye doctor every 1 to 2 years to screen for conditions such as glaucoma, macular degeneration, cataracts, etc

## 2018-08-15 NOTE — PROGRESS NOTES
SUBJECTIVE:   CC: Aung Sidhu is an 67 year old male who presents for preventative health visit.     Healthy Habits:    Do you get at least three servings of calcium containing foods daily (dairy, green leafy vegetables, etc.)? yes    Amount of exercise or daily activities, outside of work: 2 day(s) per week    Problems taking medications regularly No    Medication side effects: No    Have you had an eye exam in the past two years? yes    Do you see a dentist twice per year? no    Do you have sleep apnea, excessive snoring or daytime drowsiness?no           Today's PHQ-2 Score:   PHQ-2 ( 1999 [employer]) 4/5/2018 8/29/2016   Q1: Little interest or pleasure in doing things 0 0   Q2: Feeling down, depressed or hopeless 0 0   PHQ-2 Score 0 0       Abuse: Current or Past(Physical, Sexual or Emotional)- No  Do you feel safe in your environment - Yes    Social History   Substance Use Topics     Smoking status: Former Smoker     Smokeless tobacco: Never Used      Comment: quit in 1986     Alcohol use Yes      Comment: rare      If you drink alcohol do you typically have >3 drinks per day or >7 drinks per week? Yes - AUDIT SCORE:                           Last PSA:   PSA   Date Value Ref Range Status   11/03/2017 <0.01 0 - 4 ug/L Final     Comment:     Assay Method:  Chemiluminescence using Siemens Vista analyzer       Reviewed orders with patient. Reviewed health maintenance and updated orders accordingly - Yes      Reviewed and updated as needed this visit by clinical staff         Reviewed and updated as needed this visit by Provider        Past Medical History:   Diagnosis Date     Malignant neoplasm of prostate (H)      Meniere disease      Motion sickness      PONV (postoperative nausea and vomiting)       Past Surgical History:   Procedure Laterality Date     ARTHROSCOPY KNEE WITH DEBRIDEMENT JOINT, COMBINED Right 12/15/2016    Procedure: ARTHROSCOPY KNEE WITH DEBRIDEMENT JOINT;  Surgeon: Greg Morgan MD;   Location: [facility]     C REMV PROSTATE,RETROPUB,RADICAL  06/27/2007    [facility]     HC COLONOSCOPY W/WO BRUSH/WASH  05/19/08     HC INTERSTITIAL RAD SOURCE JESUS ALBERTO, INTERMEDIATE  1998    ganglian cyst     HC REMOVAL OF TONSILS,<12 Y/O      Tonsils <13y.o.     HC TYMPANOPLASTY W/O MASTOID, INIT/REV W/O OSS CHAIN RECONST  1991       ROS:  CONSTITUTIONAL: NEGATIVE for fever, chills, change in weight  INTEGUMENTARY/SKIN: NEGATIVE for worrisome rashes, moles or lesions  EYES: NEGATIVE for vision changes or irritation  ENT: NEGATIVE for ear, mouth and throat problems  RESP: NEGATIVE for significant cough or SOB  CV: NEGATIVE for chest pain, palpitations or peripheral edema  GI: NEGATIVE for nausea, abdominal pain, heartburn, or change in bowel habits   male: negative for dysuria, hematuria, decreased urinary stream, erectile dysfunction, urethral discharge  MUSCULOSKELETAL: NEGATIVE for significant arthralgias or myalgia  NEURO: NEGATIVE for weakness, dizziness or paresthesias  PSYCHIATRIC: NEGATIVE for changes in mood or affect    OBJECTIVE:   There were no vitals taken for this visit.  EXAM:  GENERAL: healthy, alert and no distress  EYES: Eyes grossly normal to inspection, PERRL and conjunctivae and sclerae normal  HENT: ear canals and TM's normal, nose and mouth without ulcers or lesions  NECK: no adenopathy, no asymmetry, masses, or scars and thyroid normal to palpation  RESP: lungs clear to auscultation - no rales, rhonchi or wheezes  CV: regular rate and rhythm, normal S1 S2, no S3 or S4, no murmur, click or rub, no peripheral edema and peripheral pulses strong  ABDOMEN: soft, nontender, no hepatosplenomegaly, no masses and bowel sounds normal  MS: no gross musculoskeletal defects noted, no edema  SKIN: no suspicious lesions or rashes  NEURO: Normal strength and tone, mentation intact and speech normal  PSYCH: mentation appears normal, affect normal/bright    Diagnostic Test Results:  none     ASSESSMENT/PLAN:  "  1. Routine general medical examination at a health care facility  Generally healthy.  Not due for lab work until November.    2. Screening for colon cancer  He is 10 years out.  - GASTROENTEROLOGY ADULT REF PROCEDURE ONLY [facility] [phone]; [place] Provider    3. Benign essential hypertension  Continue on his present medication due for lab work in November.    4. Malignant neoplasm of prostate (H)  Due for lab work in November and then he will follow-up with urology.  He has had PSAs of less than 0.01 the last 3 years in a row.  No problems with passing urine.      COUNSELING:  Reviewed preventive health counseling, as reflected in patient instructions       Regular exercise       Healthy diet/nutrition       Colon cancer screening    BP Readings from Last 1 Encounters:   04/05/18 141/90     Estimated body mass index is 26.43 kg/(m^2) as calculated from the following:    Height as of 11/3/17: 5' 11.1\" (1.806 m).    Weight as of 4/5/18: 190 lb (86.2 kg).           reports that he has quit smoking. He has never used smokeless tobacco.      Counseling Resources:  ATP IV Guidelines  Pooled Cohorts Equation Calculator  FRAX Risk Assessment  ICSI Preventive Guidelines  Dietary Guidelines for Americans, 2010  USDA's MyPlate  ASA Prophylaxis  Lung CA Screening    Delfino Ferguson MD  [facility]  "

## 2018-08-15 NOTE — MR AVS SNAPSHOT
After Visit Summary   8/15/2018    Aung Sidhu    MRN: [id number]           Patient Information     Date Of Birth          1950        Visit Information        Provider Department      8/15/2018 10:40 AM Delfino Ferguson MD [facility]        Today's Diagnoses     Screening for colon cancer    -  1      Care Instructions      Preventive Health Recommendations:   Male Ages 65 and over    Yearly exam:             See your health care provider every year in order to  o   Review health changes.   o   Discuss preventive care.    o   Review your medicines if your doctor has prescribed any.    Talk with your health care provider about whether you should have a test to screen for prostate cancer (PSA).    Every 3 years, have a diabetes test (fasting glucose). If you are at risk for diabetes, you should have this test more often.    Every 5 years, have a cholesterol test. Have this test more often if you are at risk for high cholesterol or heart disease.     Every 10 years, have a colonoscopy. Or, have a yearly FIT test (stool test). These exams will check for colon cancer.    Talk to with your health care provider about screening for Abdominal Aortic Aneurysm if you have a family history of AAA or have a history of smoking.    Shots:     Get a flu shot each year.     Get a tetanus shot every 10 years.     Talk to your doctor about your pneumonia vaccines. There are now two you should receive - Pneumovax (PPSV 23) and Prevnar (PCV 13).     Talk to your pharmacist about a shingles vaccine.     Talk to your doctor about the hepatitis B vaccine.  Nutrition:     Eat at least 5 servings of fruits and vegetables each day.     Eat whole-grain bread, whole-wheat pasta and brown rice instead of white grains and rice.     Get adequate Calcium and Vitamin D.   Lifestyle    Exercise for at least 150 minutes a week (30 minutes a day, 5 days a week). This will help you control your weight and  prevent disease.     Limit alcohol to one drink per day.     No smoking.     Wear sunscreen to prevent skin cancer.     See your dentist every six months for an exam and cleaning.     See your eye doctor every 1 to 2 years to screen for conditions such as glaucoma, macular degeneration, cataracts, etc     Preventive Health Recommendations:   Male Ages 65 and over    Yearly exam:             See your health care provider every year in order to  o   Review health changes.   o   Discuss preventive care.    o   Review your medicines if your doctor has prescribed any.    Talk with your health care provider about whether you should have a test to screen for prostate cancer (PSA).    Every 3 years, have a diabetes test (fasting glucose). If you are at risk for diabetes, you should have this test more often.    Every 5 years, have a cholesterol test. Have this test more often if you are at risk for high cholesterol or heart disease.     Every 10 years, have a colonoscopy. Or, have a yearly FIT test (stool test). These exams will check for colon cancer.    Talk to with your health care provider about screening for Abdominal Aortic Aneurysm if you have a family history of AAA or have a history of smoking.    Shots:     Get a flu shot each year.     Get a tetanus shot every 10 years.     Talk to your doctor about your pneumonia vaccines. There are now two you should receive - Pneumovax (PPSV 23) and Prevnar (PCV 13).     Talk to your pharmacist about a shingles vaccine.     Talk to your doctor about the hepatitis B vaccine.  Nutrition:     Eat at least 5 servings of fruits and vegetables each day.     Eat whole-grain bread, whole-wheat pasta and brown rice instead of white grains and rice.     Get adequate Calcium and Vitamin D.   Lifestyle    Exercise for at least 150 minutes a week (30 minutes a day, 5 days a week). This will help you control your weight and prevent disease.     Limit alcohol to one drink per day.     No  smoking.     Wear sunscreen to prevent skin cancer.     See your dentist every six months for an exam and cleaning.     See your eye doctor every 1 to 2 years to screen for conditions such as glaucoma, macular degeneration, cataracts, etc           Follow-ups after your visit        Additional Services     GASTROENTEROLOGY ADULT REF PROCEDURE ONLY [facility] [phone]; [place] Provider       Last Lab Result: Creatinine (mg/dL)       Date                     Value                 11/03/2017               1.14             ----------  Body mass index is 25.89 kg/(m^2).     Needed:  No  Language:  English    Patient will be contacted to schedule procedure.     Please be aware that coverage of these services is subject to the terms and limitations of your health insurance plan.  Call member services at your health plan with any benefit or coverage questions.  Any procedures must be performed at a [place] facility OR coordinated by your clinic's referral office.    Please bring the following with you to your appointment:    (1) Any X-Rays, CTs or MRIs which have been performed.  Contact the facility where they were done to arrange for  prior to your scheduled appointment.    (2) List of current medications   (3) This referral request   (4) Any documents/labs given to you for this referral                  Who to contact     If you have questions or need follow up information about today's clinic visit or your schedule please contact [facility] directly at [phone].  Normal or non-critical lab and imaging results will be communicated to you by MyChart, letter or phone within 4 business days after the clinic has received the results. If you do not hear from us within 7 days, please contact the clinic through MyChart or phone. If you have a critical or abnormal lab result, we will notify you by phone as soon as possible.  Submit refill requests through [employer] or call your  "pharmacy and they will forward the refill request to us. Please allow 3 business days for your refill to be completed.          Additional Information About Your Visit        Care EveryWhere ID     This is your Care EveryWhere ID. This could be used by other organizations to access your [place] medical records  [id number]        Your Vitals Were     Pulse Temperature Respirations Height Pulse Oximetry BMI (Body Mass Index)    76 97.8  F (36.6  C) (Temporal) 16 5' 10.87\" (1.8 m) 94% 25.89 kg/m2       Blood Pressure from Last 3 Encounters:   08/15/18 136/74   04/05/18 141/90   11/03/17 130/76    Weight from Last 3 Encounters:   08/15/18 184 lb 14.4 oz (83.9 kg)   04/05/18 190 lb (86.2 kg)   11/03/17 184 lb 12.8 oz (83.8 kg)              We Performed the Following     GASTROENTEROLOGY ADULT REF PROCEDURE ONLY [facility] [phone]; [place] Provider        Primary Care Provider Office Phone # Fax #    Delfnio Ferguson [phone] [address] [address]        Equal Access to Services     [facility] RENNY : Hadii aad ku hadasho Soomaali, waaxda luqadaha, qaybta kaalmada adeegyada, waxay derrick hayronaldon bethany ng. So [facility] [phone].    ATENCIÓN: Si habla español, tiene a reich disposición servicios gratuitos de asistencia lingüística. [facility] [phone].    We comply with applicable federal civil rights laws and [place] laws. We do not discriminate on the basis of race, color, national origin, age, disability, sex, sexual orientation, or gender identity.            Thank you!     Thank you for choosing [facility]  for your care. Our goal is always to provide you with excellent care. Hearing back from our patients is one way we can continue to improve our services. Please take a few minutes to complete the written survey that you may receive in the mail after your visit with us. Thank you!             Your Updated Medication List - " Protect others around you: Learn how to safely use, store and throw away your medicines at www.disposemymeds.org.          This list is accurate as of 8/15/18 11:11 AM.  Always use your most recent med list.                   Brand Name Dispense Instructions for use Diagnosis    lisinopril 10 MG tablet    PRINIVIL/ZESTRIL    90 tablet    Take 1 tablet (10 mg) by mouth daily    Benign essential hypertension

## 2018-08-16 ENCOUNTER — TELEPHONE (OUTPATIENT)
Dept: FAMILY MEDICINE | Facility: CLINIC | Age: 68
End: 2018-08-16

## 2018-08-16 NOTE — LETTER

## 2018-08-16 NOTE — LETTER
[address] [address] [phone]        August 16, 2018    Aung Sidhu  [address] [facility] [address]

## 2018-08-16 NOTE — TELEPHONE ENCOUNTER
Date of colonoscopy/EGD: 8/272/18  Surgeon: Dr. Salazar  Prep:Miralax  Packet:Colonoscopy/EGD instructions mailed to patient's home address.   Date: 8/16/2018      Surgery Scheduler

## 2018-08-27 ENCOUNTER — SURGERY (OUTPATIENT)
Age: 68
End: 2018-08-27
Payer: COMMERCIAL

## 2018-08-27 ENCOUNTER — HOSPITAL ENCOUNTER (OUTPATIENT)
Facility: CLINIC | Age: 68
Discharge: HOME | End: 2018-08-27
Attending: FAMILY MEDICINE | Admitting: FAMILY MEDICINE
Payer: MEDICARE

## 2018-08-27 ENCOUNTER — ANESTHESIA (OUTPATIENT)
Dept: GASTROENTEROLOGY | Facility: CLINIC | Age: 68
End: 2018-08-27
Payer: MEDICARE

## 2018-08-27 ENCOUNTER — ANESTHESIA EVENT (OUTPATIENT)
Dept: GASTROENTEROLOGY | Facility: CLINIC | Age: 68
End: 2018-08-27
Payer: MEDICARE

## 2018-08-27 VITALS
TEMPERATURE: 97.7 F | OXYGEN SATURATION: 96 % | SYSTOLIC BLOOD PRESSURE: 113 MMHG | DIASTOLIC BLOOD PRESSURE: 68 MMHG | RESPIRATION RATE: 16 BRPM

## 2018-08-27 LAB — COLONOSCOPY: NORMAL

## 2018-08-27 PROCEDURE — 25000128 H RX IP 250 OP 636: Performed by: NURSE ANESTHETIST, CERTIFIED REGISTERED

## 2018-08-27 PROCEDURE — 40000297 ZZH STATISTIC ENDO RECOVERY CLASS 1:2 EACH ADDL HOUR: Performed by: FAMILY MEDICINE

## 2018-08-27 PROCEDURE — 45378 DIAGNOSTIC COLONOSCOPY: CPT | Performed by: FAMILY MEDICINE

## 2018-08-27 PROCEDURE — G0121 COLON CA SCRN NOT HI RSK IND: HCPCS | Performed by: FAMILY MEDICINE

## 2018-08-27 PROCEDURE — 25000125 ZZHC RX 250: Performed by: NURSE ANESTHETIST, CERTIFIED REGISTERED

## 2018-08-27 PROCEDURE — 40000296 ZZH STATISTIC ENDO RECOVERY CLASS 1:2 FIRST HOUR: Performed by: FAMILY MEDICINE

## 2018-08-27 RX ORDER — ONDANSETRON 2 MG/ML
4 INJECTION INTRAMUSCULAR; INTRAVENOUS
Status: DISCONTINUED | OUTPATIENT
Start: 2018-08-27 | End: 2018-08-27 | Stop reason: HOSPADM

## 2018-08-27 RX ORDER — LIDOCAINE 40 MG/G
CREAM TOPICAL
Status: DISCONTINUED | OUTPATIENT
Start: 2018-08-27 | End: 2018-08-27 | Stop reason: HOSPADM

## 2018-08-27 RX ORDER — SODIUM CHLORIDE, SODIUM LACTATE, POTASSIUM CHLORIDE, CALCIUM CHLORIDE 600; 310; 30; 20 MG/100ML; MG/100ML; MG/100ML; MG/100ML
INJECTION, SOLUTION INTRAVENOUS CONTINUOUS
Status: DISCONTINUED | OUTPATIENT
Start: 2018-08-27 | End: 2018-08-27 | Stop reason: HOSPADM

## 2018-08-27 RX ORDER — LIDOCAINE HYDROCHLORIDE 20 MG/ML
INJECTION, SOLUTION INFILTRATION; PERINEURAL PRN
Status: DISCONTINUED | OUTPATIENT
Start: 2018-08-27 | End: 2018-08-27

## 2018-08-27 RX ORDER — PROPOFOL 10 MG/ML
INJECTION, EMULSION INTRAVENOUS CONTINUOUS PRN
Status: DISCONTINUED | OUTPATIENT
Start: 2018-08-27 | End: 2018-08-27

## 2018-08-27 RX ORDER — PROPOFOL 10 MG/ML
INJECTION, EMULSION INTRAVENOUS PRN
Status: DISCONTINUED | OUTPATIENT
Start: 2018-08-27 | End: 2018-08-27

## 2018-08-27 RX ADMIN — SODIUM CHLORIDE, POTASSIUM CHLORIDE, SODIUM LACTATE AND CALCIUM CHLORIDE: 600; 310; 30; 20 INJECTION, SOLUTION INTRAVENOUS at 12:52

## 2018-08-27 RX ADMIN — PROPOFOL 30 MG: 10 INJECTION, EMULSION INTRAVENOUS at 13:27

## 2018-08-27 RX ADMIN — PROPOFOL 20 MG: 10 INJECTION, EMULSION INTRAVENOUS at 13:28

## 2018-08-27 RX ADMIN — LIDOCAINE HYDROCHLORIDE 80 MG: 20 INJECTION, SOLUTION INFILTRATION; PERINEURAL at 13:23

## 2018-08-27 RX ADMIN — PROPOFOL 30 MG: 10 INJECTION, EMULSION INTRAVENOUS at 13:25

## 2018-08-27 RX ADMIN — PROPOFOL 150 MCG/KG/MIN: 10 INJECTION, EMULSION INTRAVENOUS at 13:30

## 2018-08-27 RX ADMIN — LIDOCAINE HYDROCHLORIDE 0.1 ML: 10 INJECTION, SOLUTION EPIDURAL; INFILTRATION; INTRACAUDAL; PERINEURAL at 12:52

## 2018-08-27 ASSESSMENT — LIFESTYLE VARIABLES: TOBACCO_USE: 1

## 2018-08-27 NOTE — DISCHARGE INSTRUCTIONS
[facility]    Home Care Following Endoscopy    Dr. Salazar      Activity:    You have just undergone an endoscopic procedure usually performed with conscious sedation.  Do not work or operate machinery (including a car) for at least 12 hours.      I encourage you to walk and attempt to pass this air as soon as possible.    Diet:    Return to the diet you were on before your procedure but eat lightly for the first 12-24 hours.    Drink plenty of water.    Resume any regular medications unless otherwise advised by your physician.  Please begin any new medication prescribed as a result of your procedure as directed by your physician.     If you had any biopsy or polyp removed please refrain from aspirin or aspirin products for 2 days.  If on Coumadin please restart as instructed by your physician.   Pain:    You may take Tylenol as needed for pain.  Expected Recovery:    You can expect some mild abdominal fullness and/or discomfort due to the air used to inflate your intestinal tract. It is also normal to have a mild sore throat after upper endoscopy.    Call Your Physician if You Have:    After Colonoscopy:  o Worsening persisting abdominal pain which is worse with activity.  o Fevers (>101 degrees F), chills or shakes.  o Passage of continued blood with bowel movements.   Any questions or concerns about your recovery, please call [phone] or after hours [phone] Nurse Advice Line.    Follow-up Care:    You should receive a call or letter with your results within 1 week. Please call if you have not received a notification of your results.

## 2018-08-27 NOTE — IP AVS SNAPSHOT
[facility]    [address] [address] [facility] [address]    Phone:  [phone]                                       After Visit Summary   8/27/2018    Aung Sidhu    MRN: [id number]           After Visit Summary Signature Page     I have received my discharge instructions, and my questions have been answered. I have discussed any challenges I see with this plan with the nurse or doctor.    ..........................................................................................................................................  Patient/Patient Representative Signature      ..........................................................................................................................................  Patient Representative Print Name and Relationship to Patient    ..................................................               ................................................  Date                                            Time    ..........................................................................................................................................  Reviewed by Signature/Title    ...................................................              ..............................................  Date                                                            Time          22EPIC Rev 08/18

## 2018-08-27 NOTE — ANESTHESIA POSTPROCEDURE EVALUATION
Patient: Aung Sidhu    Procedure(s):  colonoscopy - Wound Class: II-Clean Contaminated    Diagnosis:screening  Diagnosis Additional Information: No value filed.    Anesthesia Type:  MAC    Note:  Anesthesia Post Evaluation    Patient location during evaluation: Phase 2  Patient participation: Able to fully participate in evaluation  Level of consciousness: awake  Pain management: adequate  Airway patency: patent  Cardiovascular status: acceptable and hemodynamically stable  Respiratory status: acceptable, room air and nonlabored ventilation  Hydration status: stable  PONV: none     Anesthetic complications: None    Comments: Patient was happy with the anesthesia care received and no anesthesia related complications were noted.  I will follow up with the patient again if it is needed.        Last vitals:  Vitals:    08/27/18 1249   BP: 145/88   Temp: 97.7  F (36.5  C)   SpO2: 95%         Electronically Signed By: FRANCISCO Villalba CRNA  August 27, 2018  3:15 PM

## 2018-08-27 NOTE — H&P
"Pre-Endoscopy History and Physical     Aung Sidhu MRN# [id number]   YOB: 1950 Age: 67 year old     Date of Procedure: 8/27/2018  Primary care provider: Delfino Ferguson  Type of Endoscopy: colonoscopy  Type of Anesthesia Anticipated: MAC    HPI:    Aung is a 67 year old male who will be undergoing the above procedure.      Aung is feeling well today. Can get up a single flight of stairs without dyspnea. Estimated METS > 4. The risks and benefits of the procedure and the sedation options and risks were discussed with the patient. These include infection, bleeding, and small risk of colon perforation (1/1000 to 1/66788 depending on patient characteristics and type of procedure). Aung was also explained to alternatives for colo-rectal screening. All questions were answered and informed consent was obtained.      Patient Active Problem List   Diagnosis     Disorder of bilirubin excretion     Malignant neoplasm of prostate (H)     Counseling regarding advanced directives     Benign essential hypertension     S/P arthroscopy of right knee        Prescriptions Prior to Admission   Medication Sig Dispense Refill Last Dose     lisinopril (PRINIVIL/ZESTRIL) 10 MG tablet Take 1 tablet (10 mg) by mouth daily 90 tablet 3 8/27/2018        REVIEW OF SYSTEMS:     5 point ROS negative except as noted above in HPI, including Gen., Resp., CV, GI &  system review.      PHYSICAL EXAM:   /88  Temp 97.7  F (36.5  C) (Oral)  SpO2 95%   Estimated body mass index is 25.89 kg/(m^2) as calculated from the following:    Height as of 8/15/18: 5' 10.87\" (1.8 m).    Weight as of 8/15/18: 184 lb 14.4 oz (83.9 kg).    GENERAL APPEARANCE: alert and no distress  RESP: lungs clear and unlabored  CV: regular  ABD: soft, nt/nd    DIAGNOSTICS:    Not indicated      IMPRESSION   ASA Class 2 - Mild systemic disease        PLAN:     Plan for colonoscopy. No medical contraindications to proceed, or further work up needed. " We discussed the risks, benefits and alternatives and the patient wished to proceed.    The above has been forwarded to the consulting provider.      Signed Electronically by: Esequiel Salazar  August 27, 2018

## 2018-08-27 NOTE — ANESTHESIA CARE TRANSFER NOTE
Patient: Aung Sidhu    Procedure(s):  colonoscopy - Wound Class: II-Clean Contaminated    Diagnosis: screening  Diagnosis Additional Information: No value filed.    Anesthesia Type:   MAC     Note:  Airway :Face Mask  Patient transferred to:Phase II  Handoff Report: Identifed the Patient, Identified the Reponsible Provider, Reviewed the pertinent medical history, Discussed the surgical course, Reviewed Intra-OP anesthesia mangement and issues during anesthesia, Set expectations for post-procedure period and Allowed opportunity for questions and acknowledgement of understanding      Vitals: (Last set prior to Anesthesia Care Transfer)    CRNA VITALS  8/27/2018 1328 - 8/27/2018 1428      8/27/2018             Pulse: 80    SpO2: 98 %                Electronically Signed By: FRANCISCO Villalba CRNA  August 27, 2018  3:15 PM

## 2018-08-27 NOTE — IP AVS SNAPSHOT
MRN:[id number]                      After Visit Summary   8/27/2018    Aung Sidhu    MRN: [id number]           Thank you!     Thank you for choosing [facility] for your care. Our goal is always to provide you with excellent care. Hearing back from our patients is one way we can continue to improve our services. Please take a few minutes to complete the written survey that you may receive in the mail after you visit with us. Thank you!        Patient Information     Date Of Birth          1950        About your hospital stay     You were admitted on:  August 27, 2018 You last received care in the:  [facility]    You were discharged on:  August 27, 2018       Who to Call     For medical emergencies, please call 911.  For non-urgent questions about your medical care, please call your primary care provider or clinic, [phone]  For questions related to your surgery, please call your surgery clinic        Attending Provider     Provider Esequiel Marroquin MD [facility]       Primary Care Provider Office Phone # Fax #    Delfino Ferguson [phone]      Further instructions from your care team         [facility]    Home Care Following Endoscopy    Dr. Salazar      Activity:    You have just undergone an endoscopic procedure usually performed with conscious sedation.  Do not work or operate machinery (including a car) for at least 12 hours.      I encourage you to walk and attempt to pass this air as soon as possible.    Diet:    Return to the diet you were on before your procedure but eat lightly for the first 12-24 hours.    Drink plenty of water.    Resume any regular medications unless otherwise advised by your physician.  Please begin any new medication prescribed as a result of your procedure as directed by your physician.     If you had any biopsy or polyp removed please refrain from aspirin or aspirin products for  2 days.  If on Coumadin please restart as instructed by your physician.   Pain:    You may take Tylenol as needed for pain.  Expected Recovery:    You can expect some mild abdominal fullness and/or discomfort due to the air used to inflate your intestinal tract. It is also normal to have a mild sore throat after upper endoscopy.    Call Your Physician if You Have:    After Colonoscopy:  o Worsening persisting abdominal pain which is worse with activity.  o Fevers (>101 degrees F), chills or shakes.  o Passage of continued blood with bowel movements.   Any questions or concerns about your recovery, please call [phone] or after hours [phone] Nurse Advice Line.    Follow-up Care:    You should receive a call or letter with your results within 1 week. Please call if you have not received a notification of your results.        Pending Results     No orders found from 8/25/2018 to 8/28/2018.            Admission Information     Date & Time Provider Department Dept. Phone    8/27/2018 Esequiel Salazar MD [facility] [phone]      Your Vitals Were     Blood Pressure Temperature Pulse Oximetry             145/88 97.7  F (36.5  C) (Oral) 95%         Care EveryWhere ID     This is your Care EveryWhere ID. This could be used by other organizations to access your Center medical records  [id number]        Equal Access to Services     VALENTE LAWSON : Antionette bautistao Solorena, waaxda luqadaha, qaybta kaalmada adeegyada, deisy ng. So [facility] [phone].    ATENCIÓN: Si habla español, tiene a reich disposición servicios gratuitos de asistencia lingüística. Llame al [phone].    We comply with applicable federal civil rights laws and [place] laws. We do not discriminate on the basis of race, color, national origin, age, disability, sex, sexual orientation, or gender identity.               Review of your medicines      UNREVIEWED medicines. Ask your doctor  about these medicines        Dose / Directions    lisinopril 10 MG tablet   Commonly known as:  PRINIVIL/ZESTRIL   Used for:  Benign essential hypertension        Dose:  10 mg   Take 1 tablet (10 mg) by mouth daily   Quantity:  90 tablet   Refills:  3                Protect others around you: Learn how to safely use, store and throw away your medicines at www.disposemymeds.org.             Medication List: This is a list of all your medications and when to take them. Check marks below indicate your daily home schedule. Keep this list as a reference.      Medications           Morning Afternoon Evening Bedtime As Needed    lisinopril 10 MG tablet   Commonly known as:  PRINIVIL/ZESTRIL   Take 1 tablet (10 mg) by mouth daily

## 2018-08-27 NOTE — ANESTHESIA PREPROCEDURE EVALUATION
Anesthesia Evaluation     . Pt has had prior anesthetic. Type: General and MAC    History of anesthetic complications   - PONV        ROS/MED HX    ENT/Pulmonary:     (+)tobacco use, Past use , . .    Neurologic:  - neg neurologic ROS     Cardiovascular:     (+) hypertension-range: less than 140/90, ---. : . . . :. . Previous cardiac testing date:results:date: results:ECG reviewed date:2016 results:NSR date: results:          METS/Exercise Tolerance:     Hematologic:  - neg hematologic  ROS       Musculoskeletal: Comment: Right knee pain        GI/Hepatic:  - neg GI/hepatic ROS      (-) GERD   Renal/Genitourinary:     (+) Other Renal/ Genitourinary, history of prostate neoplasm      Endo:  - neg endo ROS       Psychiatric:  - neg psychiatric ROS       Infectious Disease:  - neg infectious disease ROS       Malignancy:   (+) Malignancy History of Prostate  Prostate CA Remission status post Surgery,         Other:    (+) No chance of pregnancy C-spine cleared: N/A, no H/O Chronic Pain,                   Physical Exam  Normal systems: cardiovascular, pulmonary and dental    Airway   Mallampati: III  TM distance: >3 FB  Neck ROM: full    Dental     Cardiovascular   Rhythm and rate: regular and normal      Pulmonary    breath sounds clear to auscultation                        Anesthesia Plan      History & Physical Review  History and physical reviewed and following examination; no interval change.    ASA Status:  2 .    NPO Status:  > 8 hours    Plan for MAC with Intravenous and Propofol induction. Maintenance will be TIVA.  Reason for MAC:  Deep or markedly invasive procedure (G8)    Dr Salazar performed the H&P pre-op.  No change from my own exam.      Postoperative Care      Consents  Anesthetic plan, risks, benefits and alternatives discussed with:  Patient.  Use of blood products discussed: No .   .                          .

## 2018-11-30 ENCOUNTER — TELEPHONE (OUTPATIENT)
Dept: FAMILY MEDICINE | Facility: CLINIC | Age: 68
End: 2018-11-30

## 2018-11-30 DIAGNOSIS — I10 BENIGN ESSENTIAL HYPERTENSION: ICD-10-CM

## 2018-12-03 RX ORDER — LISINOPRIL 10 MG/1
10 TABLET ORAL DAILY
Qty: 30 TABLET | Refills: 0 | Status: SHIPPED | OUTPATIENT
Start: 2018-12-03 | End: 2018-12-03

## 2018-12-03 RX ORDER — LISINOPRIL 10 MG/1
10 TABLET ORAL DAILY
Qty: 90 TABLET | Refills: 3 | Status: SHIPPED | OUTPATIENT
Start: 2018-12-03 | End: 2019-09-11

## 2018-12-03 NOTE — TELEPHONE ENCOUNTER
"Requested Prescriptions   Pending Prescriptions Disp Refills     lisinopril (PRINIVIL/ZESTRIL) 10 MG tablet [Pharmacy Med Name: LISINOPRIL 10MG TABS] 90 tablet 3    Last Written Prescription Date:  11/3/17  Last Fill Quantity: 90,  # refills: 3   Last office visit: 8/15/2018 with prescribing provider:     Future Office Visit:     Sig: TAKE ONE TABLET BY MOUTH ONCE DAILY    ACE Inhibitors (Including Combos) Protocol Failed    11/30/2018 10:17 AM       Failed - Normal serum creatinine on file in past 12 months    Recent Labs   Lab Test  11/03/17   1108   CR  1.14            Failed - Normal serum potassium on file in past 12 months    Recent Labs   Lab Test  11/03/17   1108   POTASSIUM  4.6            Passed - Blood pressure under 140/90 in past 12 months    BP Readings from Last 3 Encounters:   08/27/18 113/68   08/15/18 136/74   04/05/18 141/90                Passed - Recent (12 mo) or future (30 days) visit within the authorizing provider's specialty    Patient had office visit in the last 12 months or has a visit in the next 30 days with authorizing provider or within the authorizing provider's specialty.  See \"Patient Info\" tab in inbasket, or \"Choose Columns\" in Meds & Orders section of the refill encounter.             Passed - Patient is age 18 or older        Rx refilled per RN protocol.  1 month    T'd up labs that are due for provider to review.  Will forward to schedulers to schedule patient for labs.  Brielle Hyde RN      "

## 2018-12-06 DIAGNOSIS — Z12.5 SPECIAL SCREENING FOR MALIGNANT NEOPLASM OF PROSTATE: ICD-10-CM

## 2018-12-06 DIAGNOSIS — Z12.5 SCREENING FOR PROSTATE CANCER: Primary | ICD-10-CM

## 2018-12-06 DIAGNOSIS — C61 MALIGNANT NEOPLASM OF PROSTATE (H): ICD-10-CM

## 2018-12-06 DIAGNOSIS — I10 BENIGN ESSENTIAL HYPERTENSION: ICD-10-CM

## 2018-12-06 DIAGNOSIS — Z13.6 CARDIOVASCULAR SCREENING; LDL GOAL LESS THAN 130: ICD-10-CM

## 2018-12-06 NOTE — TELEPHONE ENCOUNTER
Pt is scheduled for labs tomorrow morning. Please place orders.  Thank you,  Yamilka Marcelino- Pt Rep.

## 2018-12-07 DIAGNOSIS — Z13.6 CARDIOVASCULAR SCREENING; LDL GOAL LESS THAN 130: ICD-10-CM

## 2018-12-07 DIAGNOSIS — I10 BENIGN ESSENTIAL HYPERTENSION: ICD-10-CM

## 2018-12-07 DIAGNOSIS — C61 MALIGNANT NEOPLASM OF PROSTATE (H): ICD-10-CM

## 2018-12-07 LAB
ALBUMIN SERPL-MCNC: 3.7 G/DL (ref 3.4–5)
ALP SERPL-CCNC: 98 U/L (ref 40–150)
ALT SERPL W P-5'-P-CCNC: 57 U/L (ref 0–70)
ANION GAP SERPL CALCULATED.3IONS-SCNC: 10 MMOL/L (ref 3–14)
AST SERPL W P-5'-P-CCNC: 27 U/L (ref 0–45)
BILIRUB SERPL-MCNC: 0.9 MG/DL (ref 0.2–1.3)
BUN SERPL-MCNC: 12 MG/DL (ref 7–30)
CALCIUM SERPL-MCNC: 8.5 MG/DL (ref 8.5–10.1)
CHLORIDE SERPL-SCNC: 99 MMOL/L (ref 94–109)
CHOLEST SERPL-MCNC: 161 MG/DL
CO2 SERPL-SCNC: 25 MMOL/L (ref 20–32)
CREAT SERPL-MCNC: 1.16 MG/DL (ref 0.66–1.25)
GFR SERPL CREATININE-BSD FRML MDRD: 63 ML/MIN/1.7M2
GLUCOSE SERPL-MCNC: 105 MG/DL (ref 70–99)
HDLC SERPL-MCNC: 33 MG/DL
LDLC SERPL CALC-MCNC: 90 MG/DL
NONHDLC SERPL-MCNC: 128 MG/DL
POTASSIUM SERPL-SCNC: 4.2 MMOL/L (ref 3.4–5.3)
PROT SERPL-MCNC: 7.8 G/DL (ref 6.8–8.8)
PSA SERPL-MCNC: <0.01 UG/L (ref 0–4)
SODIUM SERPL-SCNC: 134 MMOL/L (ref 133–144)
TRIGL SERPL-MCNC: 189 MG/DL

## 2018-12-07 PROCEDURE — 36415 COLL VENOUS BLD VENIPUNCTURE: CPT | Performed by: FAMILY MEDICINE

## 2018-12-07 PROCEDURE — 84153 ASSAY OF PSA TOTAL: CPT | Performed by: FAMILY MEDICINE

## 2018-12-07 PROCEDURE — 80061 LIPID PANEL: CPT | Performed by: FAMILY MEDICINE

## 2018-12-07 PROCEDURE — 80053 COMPREHEN METABOLIC PANEL: CPT | Performed by: FAMILY MEDICINE

## 2018-12-07 NOTE — LETTER
December 20, 2018      Aung Sidhu  [address] [facility] [id number]        Dear ,    We are writing to inform you of your test results.    Cholesterol good at 161 exam chemistry panel was fine your blood sugar was borderline at 105 PSA again is pretty much undetectable.  Less than 0.01     Resulted Orders   Lipid panel reflex to direct LDL Fasting   Result Value Ref Range    Cholesterol 161 <200 mg/dL    Triglycerides 189 (H) <150 mg/dL      Comment:      Borderline high:  150-199 mg/dl  High:             200-499 mg/dl  Very high:       >499 mg/dl  Fasting specimen      HDL Cholesterol 33 (L) >39 mg/dL    LDL Cholesterol Calculated 90 <100 mg/dL      Comment:      Desirable:       <100 mg/dl    Non HDL Cholesterol 128 <130 mg/dL   **Comprehensive metabolic panel FUTURE anytime   Result Value Ref Range    Sodium 134 133 - 144 mmol/L    Potassium 4.2 3.4 - 5.3 mmol/L    Chloride 99 94 - 109 mmol/L    Carbon Dioxide 25 20 - 32 mmol/L    Anion Gap 10 3 - 14 mmol/L    Glucose 105 (H) 70 - 99 mg/dL      Comment:      Fasting specimen    Urea Nitrogen 12 7 - 30 mg/dL    Creatinine 1.16 0.66 - 1.25 mg/dL    GFR Estimate 63 >60 mL/min/1.7m2      Comment:      Non  GFR Calc    GFR Estimate If Black 76 >60 mL/min/1.7m2      Comment:       GFR Calc    Calcium 8.5 8.5 - 10.1 mg/dL    Bilirubin Total 0.9 0.2 - 1.3 mg/dL    Albumin 3.7 3.4 - 5.0 g/dL    Protein Total 7.8 6.8 - 8.8 g/dL    Alkaline Phosphatase 98 40 - 150 U/L    ALT 57 0 - 70 U/L    AST 27 0 - 45 U/L   PSA, tumor marker   Result Value Ref Range    PSA <0.01 0 - 4 ug/L      Comment:      Assay Method:  Chemiluminescence using Siemens Vista analyzer       If you have any questions or concerns, please call the clinic at the number listed above.       Sincerely,        Dr. Delfino Ferguson

## 2018-12-20 NOTE — RESULT ENCOUNTER NOTE
Cholesterol good at 161 exam chemistry panel was fine your blood sugar was borderline at 105 PSA again is pretty much undetectable.  Less than 0.01

## 2019-04-04 ENCOUNTER — TRANSFERRED RECORDS (OUTPATIENT)
Dept: HEALTH INFORMATION MANAGEMENT | Facility: CLINIC | Age: 69
End: 2019-04-04

## 2019-09-11 ENCOUNTER — OFFICE VISIT (OUTPATIENT)
Dept: FAMILY MEDICINE | Facility: CLINIC | Age: 69
End: 2019-09-11
Payer: COMMERCIAL

## 2019-09-11 VITALS
HEART RATE: 91 BPM | WEIGHT: 188.4 LBS | DIASTOLIC BLOOD PRESSURE: 84 MMHG | SYSTOLIC BLOOD PRESSURE: 114 MMHG | BODY MASS INDEX: 26.38 KG/M2 | OXYGEN SATURATION: 97 % | RESPIRATION RATE: 16 BRPM | HEIGHT: 71 IN | TEMPERATURE: 97.1 F

## 2019-09-11 DIAGNOSIS — Z00.00 ENCOUNTER FOR MEDICARE ANNUAL WELLNESS EXAM: Primary | ICD-10-CM

## 2019-09-11 DIAGNOSIS — I10 BENIGN ESSENTIAL HYPERTENSION: ICD-10-CM

## 2019-09-11 DIAGNOSIS — C61 MALIGNANT NEOPLASM OF PROSTATE (H): ICD-10-CM

## 2019-09-11 DIAGNOSIS — H61.23 BILATERAL IMPACTED CERUMEN: ICD-10-CM

## 2019-09-11 PROCEDURE — 69209 REMOVE IMPACTED EAR WAX UNI: CPT | Mod: 50 | Performed by: FAMILY MEDICINE

## 2019-09-11 PROCEDURE — 99397 PER PM REEVAL EST PAT 65+ YR: CPT | Mod: 25 | Performed by: FAMILY MEDICINE

## 2019-09-11 RX ORDER — LISINOPRIL 10 MG/1
10 TABLET ORAL DAILY
Qty: 90 TABLET | Refills: 3 | Status: SHIPPED | OUTPATIENT
Start: 2019-09-11 | End: 2020-08-27

## 2019-09-11 RX ORDER — CETIRIZINE HYDROCHLORIDE 10 MG/1
10 TABLET ORAL DAILY
COMMUNITY

## 2019-09-11 RX ORDER — SILDENAFIL CITRATE 20 MG/1
20 TABLET ORAL 3 TIMES DAILY
COMMUNITY
End: 2023-11-29

## 2019-09-11 ASSESSMENT — ENCOUNTER SYMPTOMS
COUGH: 1
CONFUSION: 0
PALPITATIONS: 0
POLYDIPSIA: 1
AGITATION: 0
HEMATURIA: 0
APPETITE CHANGE: 0
FLANK PAIN: 0
MYALGIAS: 0
DYSPHORIC MOOD: 0
PHOTOPHOBIA: 0
NAUSEA: 0
DECREASED CONCENTRATION: 0
VOICE CHANGE: 0
DYSURIA: 0
WEAKNESS: 0
NERVOUS/ANXIOUS: 0
JOINT SWELLING: 0
FACIAL SWELLING: 0
VOMITING: 0
NUMBNESS: 0
NECK PAIN: 0
ANAL BLEEDING: 0
ACTIVITY CHANGE: 0
HYPERACTIVE: 0
SINUS PAIN: 0
RHINORRHEA: 1
SINUS PRESSURE: 0
ABDOMINAL PAIN: 0
ABDOMINAL DISTENTION: 0
SPEECH DIFFICULTY: 0
STRIDOR: 0
BACK PAIN: 1
EYE PAIN: 0
TROUBLE SWALLOWING: 0
HEARTBURN: 1
CONSTIPATION: 0
SHORTNESS OF BREATH: 0
BRUISES/BLEEDS EASILY: 0
SORE THROAT: 0
TREMORS: 0
WOUND: 0
EYE REDNESS: 0
CHOKING: 0
SEIZURES: 0
FATIGUE: 0
ADENOPATHY: 0
POLYPHAGIA: 0
RECTAL PAIN: 0
ARTHRALGIAS: 1
HEADACHES: 0
DIARRHEA: 0
CHEST TIGHTNESS: 0
FACIAL ASYMMETRY: 0
DIFFICULTY URINATING: 0
WHEEZING: 1
CHILLS: 0
APNEA: 0
FREQUENCY: 1
EYE DISCHARGE: 1
EYE ITCHING: 1
HALLUCINATIONS: 0
PARESTHESIAS: 0
FEVER: 0
UNEXPECTED WEIGHT CHANGE: 0
DIZZINESS: 1
DIAPHORESIS: 0
LIGHT-HEADEDNESS: 1
COLOR CHANGE: 0
HEMATOCHEZIA: 0
SLEEP DISTURBANCE: 0
NECK STIFFNESS: 0

## 2019-09-11 ASSESSMENT — ACTIVITIES OF DAILY LIVING (ADL): CURRENT_FUNCTION: NO ASSISTANCE NEEDED

## 2019-09-11 ASSESSMENT — MIFFLIN-ST. JEOR: SCORE: 1643.77

## 2019-09-11 NOTE — PATIENT INSTRUCTIONS
Patient Education   Personalized Prevention Plan  You are due for the preventive services outlined below.  Your care team is available to assist you in scheduling these services.  If you have already completed any of these items, please share that information with your care team to update in your medical record.  Health Maintenance Due   Topic Date Due     Zoster (Shingles) Vaccine (1 of 2) 09/26/2001     AORTIC ANEURYSM SCREENING (SYSTEM ASSIGNED)  09/26/2016     Pneumococcal Vaccine (2 of 2 - PPSV23) 03/30/2018     FALL RISK ASSESSMENT  11/03/2018     PHQ-2  01/01/2019     Diptheria Tetanus Pertussis (DTAP/TDAP/TD) Vaccine (2 - Td) 04/20/2019     Flu Vaccine (1) 09/01/2019     Annual Wellness Visit  08/15/2019       Exercise for a Healthier Heart     Exercise with a friend. When activity is fun, you're more likely to stick with it.   You may wonder how you can improve the health of your heart. If you re thinking about exercise, you re on the right track. You don t need to become an athlete, but you do need a certain amount of brisk exercise to help strengthen your heart. If you have been diagnosed with a heart condition, your doctor may recommend exercise to help stabilize your condition. To help make exercise a habit, choose safe, fun activities.  Be sure to check with your healthcare provider before starting an exercise program.   Why exercise?  Exercising regularly offers many healthy rewards. It can help you do all of the following:    Improve your blood cholesterol level to help prevent further heart trouble    Lower your blood pressure to help prevent a stroke or heart attack    Control diabetes, or reduce your risk of getting this disease    Improve your heart and lung function    Reach and maintain a healthy weight    Make your muscles stronger and more limber so you can stay active    Prevent falls and fractures by slowing the loss of bone mass (osteoporosis)    Manage stress better    Reduce your blood  pressure    Improve your sense of self and your body image  Exercise tips  Ease into your routine. Set small goals. Then build on them.  Exercise on most days. Aim for a total of 150 or more minutes of moderate to  vigorous intensity activity each week. Consider 40 minutes, 3 to 4 times a week. For best results, activity should last for 40 minutes on average. It is OK to work up to the 40 minute period over time. Examples of moderate-intensity activity is walking 1 mile in 15 minutes or 30 to 45 minutes of yard work.  Step up your daily activity level. Along with your exercise program, try being more active throughout the day. Walk instead of drive. Do more household tasks or yard work.  Choose one or more activities you enjoy. Walking is one of the easiest things you can do. You can also try swimming, riding a bike, dancing, or taking an exercise class.  Stop exercising and call your doctor if you:    Have chest pain or feel dizzy or lightheaded    Feel burning, tightness, pressure, or heaviness in your chest, neck, shoulders, back, or arms    Have unusual shortness of breath    Have increased joint or muscle pain    Have palpitations or an irregular heartbeat   Date Last Reviewed: 5/1/2016 2000-2018 [employer]. [address] [place] [address], [place] [address]. All rights reserved. This information is not intended as a substitute for professional medical care. Always follow your healthcare professional's instructions.          Understanding USDA MyPlate  The USDA (U.S. Department of Agriculture) has guidelines to help you make healthy food choices. These are called MyPlate. MyPlate shows the food groups that make up healthy meals using the image of a place setting. Before you eat, think about the healthiest choices for what to put onto your plate or into your cup or bowl. To learn more about building a healthy plate, visit www.choosemyplate.gov.    The food groups    Fruits. Any fruit or 100% fruit juice  counts as part of the Fruit Group. Fruits may be fresh, canned, frozen, or dried, and may be whole, cut-up, or pureed. Make half your plate fruits and vegetables.    Vegetables. Any vegetable or 100% vegetable juice counts as a member of the Vegetable Group. Vegetables may be fresh, frozen, canned, or dried. They can be served raw or cooked and may be whole, cut-up, or mashed. Make half your plate fruits and vegetables.    Grains. All foods made from grains are part of the Grains Group. These include wheat, rice, oats, cornmeal, and barley such as bread, pasta, oatmeal, cereal, tortillas, and grits. Grains should be no more than a quarter of your plate. At least half of your grains should be whole grains.    Protein. This group includes meat, poultry, seafood, beans and peas, eggs, processed soy products (like tofu), nuts (including nut butters), and seeds. Make protein choices no more than a quarter of your plate. Meat and poultry choices should be lean or low fat.    Dairy. All fluid milk products and foods made from milk that contain calcium, like yogurt and cheese, are part of the Dairy Group. (Foods that have little calcium, such as cream, butter, and cream cheese, are not part of the group.) Most dairy choices should be low-fat or fat-free.    Oils. These are fats that are liquid at room temperature. They include canola, corn, olive, soybean, and sunflower oil. Foods that are mainly oil include mayonnaise, certain salad dressings, and soft margarines. You should have only 5 to 7 teaspoons of oils a day. You probably already get this much from the food you eat.  Date Last Reviewed: 8/1/2017 2000-2018 [employer]. [address] [place] [address], [place] [address]. All rights reserved. This information is not intended as a substitute for professional medical care. Always follow your healthcare professional's instructions.          Signs of Hearing Loss     Hearing much better with one ear can be a sign  of hearing loss.     Hearing loss is a problem shared by many people. In fact, it is one of the most common health conditions, particularly as people age. Most people over age 65 have some hearing loss, and by age 80, almost everyone does. Because hearing loss usually occurs slowly over the years, you may not realize your hearing ability has gotten worse.  Have your hearing checked  Contact your healthcare provider if you:    Have to strain to hear normal conversation    Have to watch other people s faces very carefully to follow what they re saying    Need to ask people to repeat what they ve said    Often misunderstand what people are saying    Turn the volume of the television or radio up so high that others complain    Feel that people are mumbling when they re talking to you    Find that the effort to hear leaves you feeling tired and irritated    Notice, when using the phone, that you hear better with one ear than the other  Date Last Reviewed: 12/1/2016 2000-2018 [employer]. [address] [place] [address]. All rights reserved. This information is not intended as a substitute for professional medical care. Always follow your healthcare professional's instructions.

## 2019-09-11 NOTE — PROGRESS NOTES
"SUBJECTIVE:   Aung Sidhu is a 68 year old male who presents for Preventive Visit.  Are you in the first 12 months of your Medicare coverage?  No      Feet hurt sometimes. Patient would like to discuss this.    Patient would like to be tested for diabetes.     Healthy Habits:    In general, how would you rate your overall health?  Good    Frequency of exercise:  None    Duration of exercise:  Other    Do you usually eat at least 4 servings of fruit and vegetables a day, include whole grains    & fiber and avoid regularly eating high fat or \"junk\" foods?  No    Taking medications regularly:  Yes    Barriers to taking medications:  None    Medication side effects:  None    Ability to successfully perform activities of daily living:  No assistance needed    Home Safety:  No safety concerns identified    Hearing Impairment:  Difficulty following a conversation in a noisy restaurant or crowded room, feel that people are mumbling or not speaking clearly, difficulty following dialogue in the theater, difficult to understand a speaker at a public meeting or Yazidi service, need to ask people to speak up or repeat themselves and difficulty understanding speech on the telephone    In the past 6 months, have you been bothered by leaking of urine?  No    In general, how would you rate your overall mental or emotional health?  Good      PHQ-2 Total Score:    Additional concerns today:  Yes    Do you feel safe in your environment? Yes    Do you have a Health Care Directive? Yes: Advance Directive has been received and scanned.      Fall risk  Fallen 2 or more times in the past year?: No  Any fall with injury in the past year?: No  Cognitive Screening   1) Repeat 3 items (Leader, Season, Table)    2) Clock draw: NORMAL  3) 3 item recall: Recalls 1 object   Results: NORMAL clock, 1-2 items recalled: COGNITIVE IMPAIRMENT LESS LIKELY    Mini-CogTM Copyright DAVIDSON Tinoco. Licensed by the author for use in [facility]; " reprinted with permission (soob@.[facility]). All rights reserved.      Do you have sleep apnea, excessive snoring or daytime drowsiness?: no    Reviewed and updated as needed this visit by clinical staff         Reviewed and updated as needed this visit by Provider        Social History     Tobacco Use     Smoking status: Former Smoker     Smokeless tobacco: Never Used     Tobacco comment: quit in 1986   Substance Use Topics     Alcohol use: Yes     Comment: rare     If you drink alcohol do you typically have >3 drinks per day or >7 drinks per week? No    Alcohol Use 8/29/2016   Prescreen: >3 drinks/day or >7 drinks/week? The patient does not drink >3 drinks per day nor >7 drinks per week.   No flowsheet data found.            Current providers sharing in care for this patient include:   Patient Care Team:  Delfino Ferguson MD as PCP - General  Delfino Ferguson MD as Assigned PCP    The following health maintenance items are reviewed in Epic and correct as of today:  Health Maintenance   Topic Date Due     ZOSTER IMMUNIZATION (1 of 2) 09/26/2001     AORTIC ANEURYSM SCREENING (SYSTEM ASSIGNED)  09/26/2016     PNEUMOCOCCAL IMMUNIZATION 65+ LOW/MEDIUM RISK (2 of 2 - PPSV23) 03/30/2018     FALL RISK ASSESSMENT  11/03/2018     PHQ-2  01/01/2019     DTAP/TDAP/TD IMMUNIZATION (2 - Td) 04/20/2019     INFLUENZA VACCINE (1) 09/01/2019     MEDICARE ANNUAL WELLNESS VISIT  08/15/2019     ADVANCE CARE PLANNING  03/30/2022     LIPID  12/07/2023     COLONOSCOPY  08/27/2028     HEPATITIS C SCREENING  Completed     IPV IMMUNIZATION  Aged Out     MENINGITIS IMMUNIZATION  Aged Out           Review of Systems   Constitutional: Negative for activity change, appetite change, chills, diaphoresis, fatigue, fever and unexpected weight change.   HENT: Positive for hearing loss, rhinorrhea and sneezing. Negative for congestion, dental problem, drooling, ear discharge, ear pain, facial swelling, mouth sores, nosebleeds, postnasal drip, sinus  pressure, sinus pain, sore throat, tinnitus, trouble swallowing and voice change.    Eyes: Positive for discharge and itching. Negative for photophobia, pain, redness and visual disturbance.   Respiratory: Positive for cough and wheezing. Negative for apnea, choking, chest tightness, shortness of breath and stridor.    Cardiovascular: Negative for chest pain, palpitations and peripheral edema.   Gastrointestinal: Positive for heartburn. Negative for abdominal distention, abdominal pain, anal bleeding, constipation, diarrhea, hematochezia, nausea, rectal pain and vomiting.   Endocrine: Positive for cold intolerance, polydipsia and polyuria. Negative for heat intolerance and polyphagia.   Genitourinary: Positive for frequency and urgency. Negative for decreased urine volume, difficulty urinating, discharge, dysuria, enuresis, flank pain, genital sores, hematuria, impotence, penile pain, penile swelling, scrotal swelling and testicular pain.   Musculoskeletal: Positive for arthralgias and back pain. Negative for gait problem, joint swelling, myalgias, neck pain and neck stiffness.   Skin: Negative for color change, pallor, rash and wound.   Allergic/Immunologic: Positive for environmental allergies. Negative for food allergies and immunocompromised state.   Neurological: Positive for dizziness and light-headedness. Negative for tremors, seizures, syncope, facial asymmetry, speech difficulty, weakness, numbness, headaches and paresthesias.   Hematological: Negative for adenopathy. Does not bruise/bleed easily.   Psychiatric/Behavioral: Negative for agitation, behavioral problems, confusion, decreased concentration, dysphoric mood, hallucinations, mood changes, self-injury, sleep disturbance and suicidal ideas. The patient is not nervous/anxious and is not hyperactive.          OBJECTIVE:   There were no vitals taken for this visit. Estimated body mass index is 25.89 kg/m  as calculated from the following:    Height as  "of 8/15/18: 1.8 m (5' 10.87\").    Weight as of 8/15/18: 83.9 kg (184 lb 14.4 oz).  Physical Exam  GENERAL: healthy, alert and no distress  EYES: Eyes grossly normal to inspection, PERRL and conjunctivae and sclerae normal  HENT: normal cephalic/atraumatic, nose and mouth without ulcers or lesions, oropharynx clear, oral mucous membranes moist and bilateral cerumen impaction which was irrigated clear with elephant wash system on both ear canals.  NECK: no adenopathy, no asymmetry, masses, or scars and thyroid normal to palpation  RESP: lungs clear to auscultation - no rales, rhonchi or wheezes  CV: regular rate and rhythm, normal S1 S2, no S3 or S4, no murmur, click or rub, no peripheral edema and peripheral pulses strong  ABDOMEN: soft, nontender, no hepatosplenomegaly, no masses and bowel sounds normal  MS: no gross musculoskeletal defects noted, no edema  SKIN: no suspicious lesions or rashes  NEURO: Normal strength and tone, mentation intact and speech normal  PSYCH: mentation appears normal, affect normal/bright    Diagnostic Test Results:  none     ASSESSMENT / PLAN:   1. Encounter for Medicare annual wellness exam  Healthy.    2. Benign essential hypertension  Renewed his lisinopril for a year.  He will come in for labs in the future.  - lisinopril (PRINIVIL/ZESTRIL) 10 MG tablet; Take 1 tablet (10 mg) by mouth daily  Dispense: 90 tablet; Refill: 3  - **Comprehensive metabolic panel FUTURE anytime; Future  - Lipid panel reflex to direct LDL Fasting; Future    3. Bilateral impacted cerumen  Canals were nearly cerumen free after irrigation     - HC REMOVAL IMPACTED CERUMEN IRRIGATION/LVG UNILAT    4. Malignant neoplasm of prostate (H)  12 years since radical prostatectomy.  - PSA, tumor marker; Future    End of Life Planning:  Patient currently has an advanced directive: No.  I have verified the patient's ablity to prepare an advanced directive/make health care decisions.  Literature was provided to assist " "patient in preparing an advanced directive.    COUNSELING:  Reviewed preventive health counseling, as reflected in patient instructions       Regular exercise       Healthy diet/nutrition       Vision screening       Hearing screening    Estimated body mass index is 25.89 kg/m  as calculated from the following:    Height as of 8/15/18: 1.8 m (5' 10.87\").    Weight as of 8/15/18: 83.9 kg (184 lb 14.4 oz).         reports that he has quit smoking. He has never used smokeless tobacco.      Appropriate preventive services were discussed with this patient, including applicable screening as appropriate for cardiovascular disease, diabetes, osteopenia/osteoporosis, and glaucoma.  As appropriate for age/gender, discussed screening for colorectal cancer, prostate cancer, breast cancer, and cervical cancer. Checklist reviewing preventive services available has been given to the patient.    Reviewed patients plan of care and provided an AVS. The Basic Care Plan (routine screening as documented in Health Maintenance) for Aung meets the Care Plan requirement. This Care Plan has been established and reviewed with the Patient.    Counseling Resources:  ATP IV Guidelines  Pooled Cohorts Equation Calculator  Breast Cancer Risk Calculator  FRAX Risk Assessment  ICSI Preventive Guidelines  Dietary Guidelines for Americans, 2010  Graine de Cadeaux's MyPlate  ASA Prophylaxis  Lung CA Screening    Delfino Ferguson MD  [facility]    Identified Health Risks:  "

## 2020-02-07 DIAGNOSIS — C61 MALIGNANT NEOPLASM OF PROSTATE (H): ICD-10-CM

## 2020-02-07 DIAGNOSIS — I10 BENIGN ESSENTIAL HYPERTENSION: ICD-10-CM

## 2020-02-07 LAB
ALBUMIN SERPL-MCNC: 3.7 G/DL (ref 3.4–5)
ALP SERPL-CCNC: 90 U/L (ref 40–150)
ALT SERPL W P-5'-P-CCNC: 42 U/L (ref 0–70)
ANION GAP SERPL CALCULATED.3IONS-SCNC: 5 MMOL/L (ref 3–14)
AST SERPL W P-5'-P-CCNC: 24 U/L (ref 0–45)
BILIRUB SERPL-MCNC: 1.4 MG/DL (ref 0.2–1.3)
BUN SERPL-MCNC: 15 MG/DL (ref 7–30)
CALCIUM SERPL-MCNC: 8.8 MG/DL (ref 8.5–10.1)
CHLORIDE SERPL-SCNC: 103 MMOL/L (ref 94–109)
CHOLEST SERPL-MCNC: 187 MG/DL
CO2 SERPL-SCNC: 27 MMOL/L (ref 20–32)
CREAT SERPL-MCNC: 1.17 MG/DL (ref 0.66–1.25)
GFR SERPL CREATININE-BSD FRML MDRD: 64 ML/MIN/{1.73_M2}
GLUCOSE SERPL-MCNC: 107 MG/DL (ref 70–99)
HDLC SERPL-MCNC: 43 MG/DL
LDLC SERPL CALC-MCNC: 99 MG/DL
NONHDLC SERPL-MCNC: 144 MG/DL
POTASSIUM SERPL-SCNC: 4.3 MMOL/L (ref 3.4–5.3)
PROT SERPL-MCNC: 7.6 G/DL (ref 6.8–8.8)
PSA SERPL-MCNC: <0.01 UG/L (ref 0–4)
SODIUM SERPL-SCNC: 135 MMOL/L (ref 133–144)
TRIGL SERPL-MCNC: 224 MG/DL

## 2020-02-07 PROCEDURE — 80061 LIPID PANEL: CPT | Performed by: FAMILY MEDICINE

## 2020-02-07 PROCEDURE — 84153 ASSAY OF PSA TOTAL: CPT | Performed by: FAMILY MEDICINE

## 2020-02-07 PROCEDURE — 80053 COMPREHEN METABOLIC PANEL: CPT | Performed by: FAMILY MEDICINE

## 2020-02-07 PROCEDURE — 36415 COLL VENOUS BLD VENIPUNCTURE: CPT | Performed by: FAMILY MEDICINE

## 2020-02-07 NOTE — LETTER
February 10, 2020      Aung Sidhu  [address] [facility] [id number]        Dear ,    We are writing to inform you of your test results.    Labs show your PSA was again less than 0.01.  Your cholesterol was 187 triglycerides were up a little at 224 .  Chemistry panel was good your blood sugar borderline at 107.     Resulted Orders   Lipid panel reflex to direct LDL Fasting   Result Value Ref Range    Cholesterol 187 <200 mg/dL    Triglycerides 224 (H) <150 mg/dL      Comment:      Borderline high:  150-199 mg/dl  High:             200-499 mg/dl  Very high:       >499 mg/dl  Fasting specimen      HDL Cholesterol 43 >39 mg/dL    LDL Cholesterol Calculated 99 <100 mg/dL      Comment:      Desirable:       <100 mg/dl    Non HDL Cholesterol 144 (H) <130 mg/dL      Comment:      Above Desirable:  130-159 mg/dl  Borderline high:  160-189 mg/dl  High:             190-219 mg/dl  Very high:       >219 mg/dl     **Comprehensive metabolic panel FUTURE anytime   Result Value Ref Range    Sodium 135 133 - 144 mmol/L    Potassium 4.3 3.4 - 5.3 mmol/L    Chloride 103 94 - 109 mmol/L    Carbon Dioxide 27 20 - 32 mmol/L    Anion Gap 5 3 - 14 mmol/L    Glucose 107 (H) 70 - 99 mg/dL      Comment:      Fasting specimen    Urea Nitrogen 15 7 - 30 mg/dL    Creatinine 1.17 0.66 - 1.25 mg/dL    GFR Estimate 64 >60 mL/min/[1.73_m2]      Comment:      Non  GFR Calc  Starting 12/18/2018, serum creatinine based estimated GFR (eGFR) will be   calculated using the Chronic Kidney Disease Epidemiology Collaboration   (CKD-EPI) equation.      GFR Estimate If Black 74 >60 mL/min/[1.73_m2]      Comment:       GFR Calc  Starting 12/18/2018, serum creatinine based estimated GFR (eGFR) will be   calculated using the Chronic Kidney Disease Epidemiology Collaboration   (CKD-EPI) equation.      Calcium 8.8 8.5 - 10.1 mg/dL    Bilirubin Total 1.4 (H) 0.2 - 1.3 mg/dL    Albumin 3.7 3.4 - 5.0 g/dL     Protein Total 7.6 6.8 - 8.8 g/dL    Alkaline Phosphatase 90 40 - 150 U/L    ALT 42 0 - 70 U/L    AST 24 0 - 45 U/L   PSA, tumor marker   Result Value Ref Range    PSA <0.01 0 - 4 ug/L      Comment:      Assay Method:  Chemiluminescence using Siemens Vista analyzer       If you have any questions or concerns, please call the clinic at the number listed above.       Sincerely,        Dr. Ferguson

## 2020-02-10 NOTE — RESULT ENCOUNTER NOTE
Labs show your PSA was again less than 0.01.  Your cholesterol was 187 triglycerides were up a little at 224 .  Chemistry panel was good your blood sugar borderline at 107.

## 2020-06-10 ENCOUNTER — OFFICE VISIT (OUTPATIENT)
Dept: FAMILY MEDICINE | Facility: CLINIC | Age: 70
End: 2020-06-10
Payer: COMMERCIAL

## 2020-06-10 VITALS
SYSTOLIC BLOOD PRESSURE: 126 MMHG | WEIGHT: 183 LBS | HEART RATE: 66 BPM | OXYGEN SATURATION: 97 % | BODY MASS INDEX: 25.62 KG/M2 | HEIGHT: 71 IN | TEMPERATURE: 97 F | DIASTOLIC BLOOD PRESSURE: 72 MMHG | RESPIRATION RATE: 16 BRPM

## 2020-06-10 DIAGNOSIS — L30.9 ECZEMA, UNSPECIFIED TYPE: Primary | ICD-10-CM

## 2020-06-10 PROCEDURE — 99213 OFFICE O/P EST LOW 20 MIN: CPT | Performed by: NURSE PRACTITIONER

## 2020-06-10 RX ORDER — TRIAMCINOLONE ACETONIDE 5 MG/G
CREAM TOPICAL 2 TIMES DAILY
Qty: 30 G | Refills: 1 | Status: SHIPPED | OUTPATIENT
Start: 2020-06-10 | End: 2023-06-28

## 2020-06-10 ASSESSMENT — MIFFLIN-ST. JEOR: SCORE: 1614.27

## 2020-06-10 ASSESSMENT — PAIN SCALES - GENERAL: PAINLEVEL: NO PAIN (0)

## 2020-06-10 NOTE — PROGRESS NOTES
Subjective     Aung Sidhu is a 69 year old male who presents to clinic today for the following health issues:    Patient presents today for assessment of rash on the medial knees and back of the knee's that started on Saturday.   Rash is thick dry skin, cracking and extremely itchy.  Used over-the-counter 1% hydrocortisone which did give him some relief as well as he had about a 50% improvement once he stopped wearing jeans and switch to shorts.  No other new acute symptoms of concern.  No fevers no chills of any kind no respiratory congestion of any kind.  No body aches or malaise.  Eating well weight is stable.  HPI   Rash      Duration: Saturday     Description  Location: bilateral legs  Itching: moderate    Intensity:  moderate    Accompanying signs and symptoms: None    History (similar episodes/previous evaluation): None    Precipitating or alleviating factors:  New exposures:  None  Recent travel: no      Therapies tried and outcome: changed to shorts with improvement and hydrocortisone 1%        Patient Active Problem List   Diagnosis     Disorder of bilirubin excretion     Malignant neoplasm of prostate (H)     Counseling regarding advanced directives     Benign essential hypertension     S/P arthroscopy of right knee     Past Surgical History:   Procedure Laterality Date     ARTHROSCOPY KNEE WITH DEBRIDEMENT JOINT, COMBINED Right 12/15/2016    Procedure: ARTHROSCOPY KNEE WITH DEBRIDEMENT JOINT;  Surgeon: Greg Morgan MD;  Location: [facility] OR     C REMV PROSTATE,RETROPUB,RADICAL  06/27/2007    [facility]     COLONOSCOPY N/A 8/27/2018    Procedure: COLONOSCOPY;  colonoscopy;  Surgeon: Esequiel Salazar MD;  Location: [facility] GI     HC COLONOSCOPY W/WO BRUSH/WASH  05/19/08     HC INTERSTITIAL RAD SOURCE JESUS ALBERTO, INTERMEDIATE  1998    ganglian cyst     HC REMOVAL OF TONSILS,<12 Y/O      Tonsils <13y.o.     HC TYMPANOPLASTY W/O MASTOID, INIT/REV W/O OSS CHAIN RECONST  1991       Social History  "    Tobacco Use     Smoking status: Former Smoker     Smokeless tobacco: Never Used     Tobacco comment: quit in 1986   Substance Use Topics     Alcohol use: Yes     Comment: rare     Family History   Problem Relation Age of Onset     Hypertension Mother      Allergies Mother         seasonal     Anesthesia Reaction Mother         n and v     Arthritis Mother      Breast Cancer Mother      Eye Disorder Mother         cataract     Lipids Mother      Obesity Mother      Genitourinary Problems Father         kidneys stopped working, on dialaysis     Allergies Father         seasonal     Alcohol/Drug Father      Cerebrovascular Disease Paternal Grandmother          Current Outpatient Medications   Medication Sig Dispense Refill     cetirizine (KLS ALLER-MIKAEL) 10 MG tablet Take 10 mg by mouth daily       lisinopril (PRINIVIL/ZESTRIL) 10 MG tablet Take 1 tablet (10 mg) by mouth daily 90 tablet 3     omeprazole (PRILOSEC) 20 MG DR capsule Take 10 mg by mouth daily       triamcinolone (ARISTOCORT HP) 0.5 % external cream Apply topically 2 times daily To knees 30 g 1     sildenafil (REVATIO) 20 MG tablet Take 20 mg by mouth 3 times daily       Allergies   Allergen Reactions     No Known Drug Allergies          Reviewed and updated as needed this visit by Provider  Tobacco  Allergies  Meds  Problems  Med Hx  Surg Hx  Fam Hx         Review of Systems   Constitutional, HEENT, cardiovascular, pulmonary, gi and gu systems are negative, except as otherwise noted.      Objective    /72   Pulse 66   Temp 97  F (36.1  C) (Temporal)   Resp 16   Ht 1.791 m (5' 10.5\")   Wt 83 kg (183 lb)   SpO2 97%   BMI 25.89 kg/m    Body mass index is 25.89 kg/m .  Physical Exam  Vitals signs reviewed.   HENT:      Head: Normocephalic.      Nose: Nose normal.   Eyes:      Extraocular Movements: Extraocular movements intact.   Pulmonary:      Effort: Pulmonary effort is normal.   Musculoskeletal: Normal range of motion.   Skin:     " Findings: Rash present. Rash is crusting and scaling.             Comments: Plaque rash medial knees and back of knees. Dry crusting with cracking.    Neurological:      Mental Status: He is alert and oriented to person, place, and time.   Psychiatric:         Mood and Affect: Mood normal.         Thought Content: Thought content normal.         Judgment: Judgment normal.                    Assessment & Plan     1. Eczema, unspecified type  -Home care instructions provided, recommended patient switch all soaps to non-scented, he is to wear sweatpants versus jeans when working outside to avoid the chafing of the jeans on the back of the knees, oatmeal bath every other day, alternate the triamcinolone cream with a thick over-the-counter cream to treat eczema gave him information on both Stephany cream and Curel.  Discussed signs and symptoms to report for any indication of cellulitis related to infection due to the susceptibility of open skin with the rash.  Patient verbalized understanding of plan of care.  - triamcinolone (ARISTOCORT HP) 0.5 % external cream; Apply topically 2 times daily To knees  Dispense: 30 g; Refill: 1     Patient instructed to call clinic with new or worsening symptoms prior to her next follow up appointment.     See AVS    Return in about 2 weeks (around 6/24/2020), or if symptoms worsen or fail to improve, for Recheck if symptoms worsen or fail to improve.    King Temple NP  [facility]

## 2020-06-10 NOTE — PATIENT INSTRUCTIONS
Patient Education     Atopic Dermatitis (Adult)  Atopic dermatitis is a dry, itchy, red rash. It s also called eczema. The rash is chronic, or ongoing. It can come and go over time. The disease is often passed down in families. It causes a problem with the skin barrier that makes the skin more sensitive to the environment and other factors. The increased skin sensitivity causes an itch, which causes scratching. Scratching can worsen the itching or also break the skin. This can put the skin at risk of infection.  The condition is most common in people with asthma, hay fever, hives, or dry or sensitive skin. The rash may be caused by extreme heat or heavy sweating. Skin irritants can cause the rash to flare up. These can include wool or silk clothing, grease, oils, some medicines, and harsh soaps and detergents. Emotional stress can also be a trigger.  Treatment is done to relieve the itching and inflammation of the skin.  Home care  Follow these tips to care for your condition:    Keep the areas of rash clean by bathing at least every other day. Use lukewarm water to bathe. Don t use hot water, which can dry out the skin.    Don t use soaps with strong detergents. Use mild soaps made for sensitive skin.    Apply a cream or ointment to damp skin right after bathing.    Avoid things that irritate your skin. Wear absorbent, soft fabrics next to the skin rather than rough or scratchy materials.    Use mild laundry soap free of scents and perfumes. Make sure to rinse all the soap out of your clothes.    Treat any skin infection as directed.    Use oral diphenhydramine to help reduce itching. This is an antihistamine you can buy at drug and grocery stores. It can make you sleepy, so use lower doses during the daytime. Or you can use loratadine. This is an antihistamine that will not make you sleepy. Do not use diphenhydramine if you have glaucoma or have trouble urinating due to an enlarged prostate.  Follow-up care  See  your healthcare provider, or as advised. If your symptoms don t get better or if they get worse in the next 7 days, make an appointment with your healthcare provider.  When to seek medical advice  Call your healthcare provider right away  if any of these occur:    Increasing area of redness or pain in the skin    Yellow crusts or wet drainage from the rash    Fever of 100.4 F (38 C) or higher, or as directed by your healthcare provider  Date Last Reviewed: 9/1/2016 2000-2019 [employer]. [address] [place] [address]. All rights reserved. This information is not intended as a substitute for professional medical care. Always follow your healthcare professional's instructions.         Alternate the steroid cream with over the counter Vanicream or Curel.     Wear sweat pants not jeans for working.   Call Clinic if symptoms do not improve in 2 weeks.     King Temple CNP

## 2020-08-27 DIAGNOSIS — I10 BENIGN ESSENTIAL HYPERTENSION: ICD-10-CM

## 2020-08-27 RX ORDER — LISINOPRIL 10 MG/1
TABLET ORAL
Qty: 90 TABLET | Refills: 1 | Status: SHIPPED | OUTPATIENT
Start: 2020-08-27 | End: 2021-03-01

## 2020-08-27 NOTE — TELEPHONE ENCOUNTER
Prescription approved per [facility] Refill Protocol.    Elizabet Schaefer RN on 8/27/2020 at 1:29 PM

## 2020-10-15 ENCOUNTER — OFFICE VISIT (OUTPATIENT)
Dept: FAMILY MEDICINE | Facility: CLINIC | Age: 70
End: 2020-10-15
Payer: COMMERCIAL

## 2020-10-15 VITALS
TEMPERATURE: 97.9 F | BODY MASS INDEX: 25.91 KG/M2 | RESPIRATION RATE: 16 BRPM | DIASTOLIC BLOOD PRESSURE: 80 MMHG | OXYGEN SATURATION: 99 % | HEART RATE: 72 BPM | WEIGHT: 183.2 LBS | SYSTOLIC BLOOD PRESSURE: 134 MMHG

## 2020-10-15 DIAGNOSIS — Z00.01 ENCOUNTER FOR GENERAL ADULT MEDICAL EXAMINATION WITH ABNORMAL FINDINGS: Primary | ICD-10-CM

## 2020-10-15 DIAGNOSIS — G60.9 IDIOPATHIC PERIPHERAL NEUROPATHY: ICD-10-CM

## 2020-10-15 DIAGNOSIS — I10 BENIGN ESSENTIAL HYPERTENSION: ICD-10-CM

## 2020-10-15 LAB
ALBUMIN SERPL-MCNC: 4 G/DL (ref 3.4–5)
ALP SERPL-CCNC: 88 U/L (ref 40–150)
ALT SERPL W P-5'-P-CCNC: 38 U/L (ref 0–70)
ANION GAP SERPL CALCULATED.3IONS-SCNC: 6 MMOL/L (ref 3–14)
AST SERPL W P-5'-P-CCNC: 19 U/L (ref 0–45)
BILIRUB SERPL-MCNC: 1.3 MG/DL (ref 0.2–1.3)
BUN SERPL-MCNC: 13 MG/DL (ref 7–30)
CALCIUM SERPL-MCNC: 9.3 MG/DL (ref 8.5–10.1)
CHLORIDE SERPL-SCNC: 98 MMOL/L (ref 94–109)
CHOLEST SERPL-MCNC: 179 MG/DL
CO2 SERPL-SCNC: 27 MMOL/L (ref 20–32)
CREAT SERPL-MCNC: 1.21 MG/DL (ref 0.66–1.25)
GFR SERPL CREATININE-BSD FRML MDRD: 61 ML/MIN/{1.73_M2}
GLUCOSE SERPL-MCNC: 102 MG/DL (ref 70–99)
HDLC SERPL-MCNC: 42 MG/DL
LDLC SERPL CALC-MCNC: 97 MG/DL
NONHDLC SERPL-MCNC: 137 MG/DL
POTASSIUM SERPL-SCNC: 4.7 MMOL/L (ref 3.4–5.3)
PROT SERPL-MCNC: 8 G/DL (ref 6.8–8.8)
SODIUM SERPL-SCNC: 131 MMOL/L (ref 133–144)
TRIGL SERPL-MCNC: 202 MG/DL

## 2020-10-15 PROCEDURE — 36415 COLL VENOUS BLD VENIPUNCTURE: CPT | Performed by: FAMILY MEDICINE

## 2020-10-15 PROCEDURE — 80061 LIPID PANEL: CPT | Performed by: FAMILY MEDICINE

## 2020-10-15 PROCEDURE — 80053 COMPREHEN METABOLIC PANEL: CPT | Performed by: FAMILY MEDICINE

## 2020-10-15 PROCEDURE — 99213 OFFICE O/P EST LOW 20 MIN: CPT | Mod: 25 | Performed by: FAMILY MEDICINE

## 2020-10-15 PROCEDURE — G0438 PPPS, INITIAL VISIT: HCPCS | Performed by: FAMILY MEDICINE

## 2020-10-15 RX ORDER — GABAPENTIN 300 MG/1
300 CAPSULE ORAL AT BEDTIME
Qty: 30 CAPSULE | Refills: 1 | Status: SHIPPED | OUTPATIENT
Start: 2020-10-15 | End: 2020-12-16

## 2020-10-15 RX ORDER — DIPHENHYDRAMINE CITRATE AND IBUPROFEN 38; 200 MG/1; MG/1
1 TABLET, COATED ORAL
COMMUNITY

## 2020-10-15 ASSESSMENT — PAIN SCALES - GENERAL: PAINLEVEL: NO PAIN (0)

## 2020-10-15 NOTE — PATIENT INSTRUCTIONS
Patient Education   Personalized Prevention Plan  You are due for the preventive services outlined below.  Your care team is available to assist you in scheduling these services.  If you have already completed any of these items, please share that information with your care team to update in your medical record.  Health Maintenance Due   Topic Date Due     Zoster (Shingles) Vaccine (1 of 2) 09/26/2001     AORTIC ANEURYSM SCREENING (SYSTEM ASSIGNED)  09/26/2016     Pneumococcal Vaccine (2 of 2 - PPSV23) 03/30/2018     Diptheria Tetanus Pertussis (DTAP/TDAP/TD) Vaccine (2 - Td) 04/20/2019     FALL RISK ASSESSMENT  09/11/2020

## 2020-10-15 NOTE — PROGRESS NOTES
SUBJECTIVE:   Aung Sidhu is a 70 year old male who presents for Preventive Visit.    Patient has been advised of split billing requirements and indicates understanding: Yes   Are you in the first 12 months of your Medicare coverage?  No    HPI  Do you feel safe in your environment? Yes    Have you ever done Advance Care Planning? (For example, a Health Directive, POLST, or a discussion with a medical provider or your loved ones about your wishes): Yes, patient states has an Advance Care Planning document and will bring a copy to the clinic.    Fall risk  Fallen 2 or more times in the past year?: No  Any fall with injury in the past year?: No    Cognitive Screening   1) Repeat 3 items (Leader, Season, Table)    2) Clock draw: NORMAL  3) 3 item recall: Recalls 2 objects   Results: NORMAL clock, 1-2 items recalled: COGNITIVE IMPAIRMENT LESS LIKELY    Mini-CogTM Copyright DAVIDSON Tinoco. Licensed by the author for use in [facility]; reprinted with permission (taj@[facility]). All rights reserved.      Do you have sleep apnea, excessive snoring or daytime drowsiness?: no    Reviewed and updated as needed this visit by clinical staff  Tobacco  Allergies  Meds      Soc Hx        Reviewed and updated as needed this visit by Provider                Social History     Tobacco Use     Smoking status: Former Smoker     Smokeless tobacco: Never Used     Tobacco comment: quit in 1986   Substance Use Topics     Alcohol use: Yes     Comment: rare     If you drink alcohol do you typically have >3 drinks per day or >7 drinks per week? No    Hypertension Follow-up      Do you check your blood pressure regularly outside of the clinic? No     Are you following a low salt diet? He watches his salt    Are your blood pressures ever more than 140 on the top number (systolic) OR more   than 90 on the bottom number (diastolic), for example 140/90? n/a    Also complaining of increased problems with tingling and burning in the  "bottom of his feet.  The end of the day he can hardly sleep at night.  He takes Aleve PM and it does help.  Is been told he has a neuropathy.  Current providers sharing in care for this patient include:   Patient Care Team:  Delfino Ferguson MD as PCP - King Elliott NP as Assigned PCP    The following health maintenance items are reviewed in Epic and correct as of today:  Health Maintenance   Topic Date Due     ZOSTER IMMUNIZATION (1 of 2) 09/26/2001     AORTIC ANEURYSM SCREENING (SYSTEM ASSIGNED)  09/26/2016     Pneumococcal Vaccine: 65+ Years (2 of 2 - PPSV23) 03/30/2018     DTAP/TDAP/TD IMMUNIZATION (2 - Td) 04/20/2019     MEDICARE ANNUAL WELLNESS VISIT  09/11/2020     FALL RISK ASSESSMENT  09/11/2020     ADVANCE CARE PLANNING  03/30/2022     LIPID  02/07/2025     COLORECTAL CANCER SCREENING  08/27/2028     HEPATITIS C SCREENING  Completed     PHQ-2  Completed     INFLUENZA VACCINE  Completed     Pneumococcal Vaccine: Pediatrics (0 to 5 Years) and At-Risk Patients (6 to 64 Years)  Aged Out     IPV IMMUNIZATION  Aged Out     MENINGITIS IMMUNIZATION  Aged Out     HEPATITIS B IMMUNIZATION  Aged Out     Lab work is in process  Pneumonia Vaccine: Will return for this as he just had influenza vaccine 2 weeks ago.    Review of Systems  Constitutional, HEENT, cardiovascular, pulmonary, GI, , musculoskeletal, neuro, skin, endocrine and psych systems are negative, except as otherwise noted.    OBJECTIVE:   /80   Pulse 72   Temp 97.9  F (36.6  C) (Temporal)   Resp 16   Wt 83.1 kg (183 lb 3.2 oz)   SpO2 99%   BMI 25.91 kg/m   Estimated body mass index is 25.91 kg/m  as calculated from the following:    Height as of 6/10/20: 1.791 m (5' 10.5\").    Weight as of this encounter: 83.1 kg (183 lb 3.2 oz).  Physical Exam  GENERAL: healthy, alert and no distress  EYES: Eyes grossly normal to inspection, PERRL and conjunctivae and sclerae normal  HENT: ear canals and TM's normal, nose and mouth " without ulcers or lesions  NECK: no adenopathy, no asymmetry, masses, or scars and thyroid normal to palpation  RESP: lungs clear to auscultation - no rales, rhonchi or wheezes  CV: regular rate and rhythm, normal S1 S2, no S3 or S4, no murmur, click or rub, no peripheral edema and peripheral pulses strong  ABDOMEN: soft, nontender, no hepatosplenomegaly, no masses and bowel sounds normal  MS: no gross musculoskeletal defects noted, no edema  SKIN: no suspicious lesions or rashes  NEURO: Normal strength and tone, mentation intact and speech normal  PSYCH: mentation appears normal, affect normal/bright    Diagnostic Test Results:  Labs reviewed in Epic  Results for orders placed or performed in visit on 10/15/20 (from the past 24 hour(s))   Lipid panel reflex to direct LDL Fasting   Result Value Ref Range    Cholesterol 179 <200 mg/dL    Triglycerides 202 (H) <150 mg/dL    HDL Cholesterol 42 >39 mg/dL    LDL Cholesterol Calculated 97 <100 mg/dL    Non HDL Cholesterol 137 (H) <130 mg/dL   Comprehensive metabolic panel   Result Value Ref Range    Sodium 131 (L) 133 - 144 mmol/L    Potassium 4.7 3.4 - 5.3 mmol/L    Chloride 98 94 - 109 mmol/L    Carbon Dioxide 27 20 - 32 mmol/L    Anion Gap 6 3 - 14 mmol/L    Glucose 102 (H) 70 - 99 mg/dL    Urea Nitrogen 13 7 - 30 mg/dL    Creatinine 1.21 0.66 - 1.25 mg/dL    GFR Estimate 61 >60 mL/min/[1.73_m2]    GFR Estimate If Black 70 >60 mL/min/[1.73_m2]    Calcium 9.3 8.5 - 10.1 mg/dL    Bilirubin Total 1.3 0.2 - 1.3 mg/dL    Albumin 4.0 3.4 - 5.0 g/dL    Protein Total 8.0 6.8 - 8.8 g/dL    Alkaline Phosphatase 88 40 - 150 U/L    ALT 38 0 - 70 U/L    AST 19 0 - 45 U/L       ASSESSMENT / PLAN:   1. Encounter for general adult medical examination with abnormal findings  He needs several immunizations he will come back for tetanus and Shingrix and pneumococcal #23.    2. Idiopathic peripheral neuropathy  Discussed this with him.  We will try Neurontin.  Warned him of side effects.   "Start 1 at bedtime and then moved to twice a day and up to 3 times a day if tolerated.  If this does not seem to be helping we will have him see neurology.  - gabapentin (NEURONTIN) 300 MG capsule; Take 1 capsule (300 mg) by mouth At Bedtime  Dispense: 30 capsule; Refill: 1  - OFFICE/OUTPT VISIT,EST,LEVL III    3. Benign essential hypertension  We will call him with the lab results.  Have these done 8 months ago but we want to get him back on schedule.  Will wait till next year to do PSA tumor marker on him.  - Lipid panel reflex to direct LDL Fasting  - Comprehensive metabolic panel  - OFFICE/OUTPT VISIT,EST,LEVL III    Patient has been advised of split billing requirements and indicates understanding: Yes  COUNSELING:  Reviewed preventive health counseling, as reflected in patient instructions       Regular exercise       Healthy diet/nutrition       Vision screening    Estimated body mass index is 25.91 kg/m  as calculated from the following:    Height as of 6/10/20: 1.791 m (5' 10.5\").    Weight as of this encounter: 83.1 kg (183 lb 3.2 oz).        He reports that he has quit smoking. He has never used smokeless tobacco.      Appropriate preventive services were discussed with this patient, including applicable screening as appropriate for cardiovascular disease, diabetes, osteopenia/osteoporosis, and glaucoma.  As appropriate for age/gender, discussed screening for colorectal cancer, prostate cancer, breast cancer, and cervical cancer. Checklist reviewing preventive services available has been given to the patient.    Reviewed patients plan of care and provided an AVS. The Basic Care Plan (routine screening as documented in Health Maintenance) for Aung meets the Care Plan requirement. This Care Plan has been established and reviewed with the Patient.    Counseling Resources:  ATP IV Guidelines  Pooled Cohorts Equation Calculator  Breast Cancer Risk Calculator  Breast Cancer: Medication to Reduce Risk  FRAX " Risk Assessment  ICSI Preventive Guidelines  Dietary Guidelines for Americans, 2010  USDA's MyPlate  ASA Prophylaxis  Lung CA Screening    Delfino Ferguson MD  [facility]    Identified Health Risks:

## 2020-10-28 NOTE — RESULT ENCOUNTER NOTE
Labs from 2 weeks ago were good your blood sugar was 102 is borderline remainder of chemistry panel was fine your cholesterol was 179 with an LDL cholesterol of 97 these are very good.

## 2020-12-16 DIAGNOSIS — G60.9 IDIOPATHIC PERIPHERAL NEUROPATHY: ICD-10-CM

## 2020-12-16 RX ORDER — GABAPENTIN 300 MG/1
CAPSULE ORAL
Qty: 30 CAPSULE | Refills: 1 | Status: SHIPPED | OUTPATIENT
Start: 2020-12-16 | End: 2021-02-16

## 2020-12-16 NOTE — TELEPHONE ENCOUNTER
Requested Prescriptions   Pending Prescriptions Disp Refills     gabapentin (NEURONTIN) 300 MG capsule [Pharmacy Med Name: GABAPENTIN 300MG CAPS] 30 capsule 1     Sig: TAKE ONE CAPSULE BY MOUTH AT BEDTIME     Last Written Prescription Date:  10/15/2020  Last Fill Quantity: 30,   # refills: 1  Last Office Visit: 10/15/2020  Future Office visit:       Routing refill request to provider for review/approval because:  Drug not on the G, P or [facility] refill protocol or controlled substance    Sherry Zayas MA

## 2021-02-14 DIAGNOSIS — G60.9 IDIOPATHIC PERIPHERAL NEUROPATHY: ICD-10-CM

## 2021-02-16 RX ORDER — GABAPENTIN 300 MG/1
CAPSULE ORAL
Qty: 30 CAPSULE | Refills: 1 | Status: SHIPPED | OUTPATIENT
Start: 2021-02-16 | End: 2021-04-21

## 2021-02-16 NOTE — TELEPHONE ENCOUNTER
Gabapentin      Last Written Prescription Date:  12/16/2020  Last Fill Quantity: 30,   # refills: 1  Last Office Visit: 10/15/2020  Future Office visit:   None  Routing refill request to provider for review/approval because:  Drug not on the [facility], P or [facility] refill protocol or controlled substance    Loida De Paz RN

## 2021-02-27 DIAGNOSIS — I10 BENIGN ESSENTIAL HYPERTENSION: ICD-10-CM

## 2021-03-01 RX ORDER — LISINOPRIL 10 MG/1
TABLET ORAL
Qty: 90 TABLET | Refills: 1 | Status: SHIPPED | OUTPATIENT
Start: 2021-03-01 | End: 2021-08-27

## 2021-03-01 NOTE — TELEPHONE ENCOUNTER
Prescription approved per [facility] Refill Protocol.    SUMAN SolorioN, RN  [facility]

## 2021-04-20 DIAGNOSIS — G60.9 IDIOPATHIC PERIPHERAL NEUROPATHY: ICD-10-CM

## 2021-04-20 NOTE — TELEPHONE ENCOUNTER
Requested Prescriptions   Pending Prescriptions Disp Refills     gabapentin (NEURONTIN) 300 MG capsule [Pharmacy Med Name: GABAPENTIN 300MG CAPS] 30 capsule 1     Sig: TAKE ONE CAPSULE BY MOUTH AT BEDTIME     Last Written Prescription Date:  02/16/2021  Last Fill Quantity: 30,   # refills: 1  Last Office Visit: 10/15/2020  Future Office visit:       Routing refill request to provider for review/approval because:  Drug not on the G, P or [facility] refill protocol or controlled substance    Sherry Zayas MA

## 2021-04-21 RX ORDER — GABAPENTIN 300 MG/1
CAPSULE ORAL
Qty: 30 CAPSULE | Refills: 1 | Status: SHIPPED | OUTPATIENT
Start: 2021-04-21 | End: 2021-06-21

## 2021-05-20 ENCOUNTER — TELEPHONE (OUTPATIENT)
Dept: FAMILY MEDICINE | Facility: CLINIC | Age: 71
End: 2021-05-20

## 2021-05-20 DIAGNOSIS — G60.9 IDIOPATHIC PERIPHERAL NEUROPATHY: Primary | ICD-10-CM

## 2021-05-20 NOTE — TELEPHONE ENCOUNTER
Yes please see why he is taking the gabapentin and if it is helping him. We can lower to 100mg or 200mg at night but will need to do a new script of 100mg pills.

## 2021-05-20 NOTE — TELEPHONE ENCOUNTER
Patient was taking it for bilateral foot pain. Patient uses [facility] in [place]. Patient will start with the 100 mg and see if that works for him.    Natacha Chaves, CMA

## 2021-05-20 NOTE — TELEPHONE ENCOUNTER
Patient is taking this medication at night and when he takes he gets really drowsy in the am and would like to know if we can taper the dose a little to see if that helps with the drowsiness.  patient has a foggy feeling,when he drives.   He usually takes 1 hr before bed time.    Patient is aware that this will be routed to a covering provider to review and advise if able     Alison ORTIZ

## 2021-05-21 RX ORDER — GABAPENTIN 100 MG/1
100 CAPSULE ORAL AT BEDTIME
Qty: 30 CAPSULE | Refills: 1 | Status: SHIPPED | OUTPATIENT
Start: 2021-05-21 | End: 2021-07-20

## 2021-06-20 DIAGNOSIS — G60.9 IDIOPATHIC PERIPHERAL NEUROPATHY: ICD-10-CM

## 2021-06-21 RX ORDER — GABAPENTIN 300 MG/1
CAPSULE ORAL
Qty: 30 CAPSULE | Refills: 1 | Status: SHIPPED | OUTPATIENT
Start: 2021-06-21 | End: 2021-08-20

## 2021-06-21 NOTE — TELEPHONE ENCOUNTER
Requested Prescriptions   Pending Prescriptions Disp Refills     gabapentin (NEURONTIN) 300 MG capsule [Pharmacy Med Name: GABAPENTIN 300MG CAPS] 30 capsule 1     Sig: TAKE ONE CAPSULE BY MOUTH AT BEDTIME     Last Written Prescription Date:  04/21/2021  Last Fill Quantity: 30,   # refills: 1  Last Office Visit: 10/15/2020  Future Office visit:       Routing refill request to provider for review/approval because:  Drug not on the G, P or [facility] refill protocol or controlled substance

## 2021-07-19 NOTE — PROGRESS NOTES
Assessment & Plan   Problem List Items Addressed This Visit     None      Visit Diagnoses     Cough    -  Primary    Relevant Medications    azithromycin (ZITHROMAX) 250 MG tablet    Other Relevant Orders    XR Chest 2 Views (Completed)    Suspected COVID-19 virus infection        Relevant Orders    Symptomatic COVID-19 Virus (Coronavirus) by PCR Nasopharyngeal    Idiopathic peripheral neuropathy        Relevant Orders    Pain Management Referral    Dysphagia, unspecified type        Relevant Orders    XR Esophagram w Upper GI         Impression is likely viral URI including COVID-19. Will order COVID-19 PCR, though this is less likely given vaccination status. Appears well and non-toxic and I have low suspicion for impending airway obstruction or respiratory distress. CXR shows no worrisome findings. He will push p.o. fluids, use over-the-counter meds for symptoms, complete a course of azithromycin to cover for atypical CAP and follow-up with us in 2 weeks if not improving or urgent care/the ER if symptoms worsen/change at any time. I'll also order an esophagram for what sounds like esophageal dysphagia and pain service referral for BLE neuropathy.  Both can be used prn. No symptoms of acute dysphagia or obstruction.     Complete history and physical exam as below. AF with normal VS.    DDx and Dx discussed with and explained to the pt to their satisfaction.  All questions were answered at this time. Pt expressed understanding of and agreement with this dx, tx, and plan. No further workup warranted and standard medication warnings given. I have given the patient a list of pertinent indications for re-evaluation. Will go to the Emergency Department if symptoms worsen or new concerning symptoms arise. Patient left in no apparent distress.     43 minutes spent on the date of the encounter doing chart review, history and exam, documentation and further activities per the note     See Patient Instructions    Return  in about 2 weeks (around 8/4/2021) for a recheck of your symptoms if not improving, or call 911/go to an ER anytime if worsening.    CARRIE Ramirez [facility] KIM Horan is a 70 year old who presents for the following health issues     HPI     Acute Illness  Persistent cough x 1 month, but otherwise feels normal.   Acute illness concerns: Cough  Onset/Duration: 1 month  Symptoms:  Fever: no  Chills/Sweats: no  Headache (location?): no  Sinus Pressure: no  Conjunctivitis:  no  Ear Pain: no  Rhinorrhea: YES- once in a while  Congestion: no  Sore Throat: no  Cough: YES-productive of green sputum  Wheeze: no  Decreased Appetite: no  Nausea: no  Vomiting: no  Diarrhea: no  Dysuria/Freq.: YES- normal for patient  Dysuria or Hematuria: no  Fatigue/Achiness: no  Sick/Strep Exposure: no  Therapies tried and outcome: None    Would like to see a specialist for his ongoing neuropathy. Gabapentin helps at at bedtime.     Would like to evaluated for dysphagia with swallowing certain foods, e.g. apples over the last few months.    Review of Systems   Constitutional, HEENT, cardiovascular, pulmonary, gi and gu systems are negative, except as otherwise noted.      Objective    BP (!) 148/89   Pulse 69   Temp 97  F (36.1  C)   Resp 16   SpO2 98%   There is no height or weight on file to calculate BMI.  Physical Exam  Vitals and nursing note reviewed.   Constitutional:       General: He is not in acute distress.     Appearance: He is not ill-appearing or diaphoretic.   HENT:      Head: Normocephalic and atraumatic.      Mouth/Throat:      Mouth: Mucous membranes are moist.      Pharynx: Oropharynx is clear.   Eyes:      Conjunctiva/sclera: Conjunctivae normal.   Neck:      Comments: Neck non-tender.  Cardiovascular:      Rate and Rhythm: Normal rate and regular rhythm.      Heart sounds: Normal heart sounds. No murmur heard.   No friction rub. No gallop.    Pulmonary:      Effort:  Pulmonary effort is normal. No respiratory distress.      Breath sounds: Normal breath sounds. No stridor. No wheezing, rhonchi or rales.   Abdominal:      General: Bowel sounds are normal. There is no distension.      Palpations: Abdomen is soft. There is no mass.      Tenderness: There is no abdominal tenderness. There is no guarding or rebound.      Hernia: No hernia is present.   Musculoskeletal:      Cervical back: Normal range of motion and neck supple. No rigidity.   Lymphadenopathy:      Cervical: No cervical adenopathy.   Skin:     General: Skin is warm and dry.   Neurological:      General: No focal deficit present.      Mental Status: He is alert. Mental status is at baseline.   Psychiatric:         Mood and Affect: Mood normal.         Behavior: Behavior normal.          Results for orders placed or performed in visit on 07/21/21   XR Chest 2 Views     Status: None    Narrative    CHEST TWO VIEWS  7/21/2021 8:11 AM     HISTORY: 70-year-old patient with history of cough.       Impression    IMPRESSION: Since March 30, 2017, heart size is normal. No pleural  effusion, pneumothorax, or abnormal area of consolidation.    EMMANUELLE GUALLPA MD         SYSTEM ID:  [id number]   Results for orders placed or performed in visit on 07/21/21   Symptomatic COVID-19 Virus (Coronavirus) by PCR Nasopharyngeal     Status: None (In process)    Specimen: Nasopharyngeal; Swab    Narrative    The following orders were created for panel order Symptomatic COVID-19 Virus (Coronavirus) by PCR Nasopharyngeal.  Procedure                               Abnormality         Status                     ---------                               -----------         ------                     SARS-COV2 (COVID-19) Vir...[[id number]]                      In process                   Please view results for these tests on the individual orders.

## 2021-07-20 DIAGNOSIS — G60.9 IDIOPATHIC PERIPHERAL NEUROPATHY: ICD-10-CM

## 2021-07-20 RX ORDER — GABAPENTIN 100 MG/1
CAPSULE ORAL
Qty: 30 CAPSULE | Refills: 1 | Status: SHIPPED | OUTPATIENT
Start: 2021-07-20 | End: 2021-08-30 | Stop reason: DRUGHIGH

## 2021-07-20 NOTE — TELEPHONE ENCOUNTER
Requested Prescriptions   Pending Prescriptions Disp Refills     gabapentin (NEURONTIN) 100 MG capsule [Pharmacy Med Name: GABAPENTIN 100MG CAPS] 30 capsule 1     Sig: TAKE ONE CAPSULE BY MOUTH AT BEDTIME     Last Written Prescription Date:  06/21/2021  Last Fill Quantity: 30,   # refills: 1  Last Office Visit: 10/15/2020  Future Office visit:    Next 5 appointments (look out 90 days)    Jul 21, 2021  7:20 AM  SHORT with CARRIE Ramirez  [facility] [place] ([facility] ) [address]  [place] [address]  [phone]           Routing refill request to provider for review/approval because:  Drug not on the G, P or [facility] refill protocol or controlled substance

## 2021-07-21 ENCOUNTER — OFFICE VISIT (OUTPATIENT)
Dept: FAMILY MEDICINE | Facility: CLINIC | Age: 71
End: 2021-07-21
Payer: COMMERCIAL

## 2021-07-21 ENCOUNTER — ANCILLARY PROCEDURE (OUTPATIENT)
Dept: GENERAL RADIOLOGY | Facility: CLINIC | Age: 71
End: 2021-07-21
Attending: PHYSICIAN ASSISTANT
Payer: COMMERCIAL

## 2021-07-21 VITALS
HEART RATE: 69 BPM | DIASTOLIC BLOOD PRESSURE: 89 MMHG | TEMPERATURE: 97 F | SYSTOLIC BLOOD PRESSURE: 148 MMHG | RESPIRATION RATE: 16 BRPM | OXYGEN SATURATION: 98 %

## 2021-07-21 DIAGNOSIS — R05.9 COUGH: ICD-10-CM

## 2021-07-21 DIAGNOSIS — G60.9 IDIOPATHIC PERIPHERAL NEUROPATHY: ICD-10-CM

## 2021-07-21 DIAGNOSIS — R13.10 DYSPHAGIA, UNSPECIFIED TYPE: ICD-10-CM

## 2021-07-21 DIAGNOSIS — R05.9 COUGH: Primary | ICD-10-CM

## 2021-07-21 DIAGNOSIS — Z20.822 SUSPECTED COVID-19 VIRUS INFECTION: ICD-10-CM

## 2021-07-21 LAB — SARS-COV-2 RNA RESP QL NAA+PROBE: NEGATIVE

## 2021-07-21 PROCEDURE — 99215 OFFICE O/P EST HI 40 MIN: CPT | Performed by: PHYSICIAN ASSISTANT

## 2021-07-21 PROCEDURE — 71046 X-RAY EXAM CHEST 2 VIEWS: CPT | Performed by: RADIOLOGY

## 2021-07-21 PROCEDURE — U0005 INFEC AGEN DETEC AMPLI PROBE: HCPCS | Performed by: PHYSICIAN ASSISTANT

## 2021-07-21 PROCEDURE — U0003 INFECTIOUS AGENT DETECTION BY NUCLEIC ACID (DNA OR RNA); SEVERE ACUTE RESPIRATORY SYNDROME CORONAVIRUS 2 (SARS-COV-2) (CORONAVIRUS DISEASE [COVID-19]), AMPLIFIED PROBE TECHNIQUE, MAKING USE OF HIGH THROUGHPUT TECHNOLOGIES AS DESCRIBED BY CMS-2020-01-R: HCPCS | Performed by: PHYSICIAN ASSISTANT

## 2021-07-21 RX ORDER — AZITHROMYCIN 250 MG/1
TABLET, FILM COATED ORAL
Qty: 6 TABLET | Refills: 0 | Status: SHIPPED | OUTPATIENT
Start: 2021-07-21 | End: 2021-07-26

## 2021-07-21 NOTE — PATIENT INSTRUCTIONS
Shimon Horan,    Thank you for allowing [facility] to manage your care.    I am unsure of the cause of your symptoms, but your exam is reassuring. We will see what our workup shows.     If you develop worsening/changing symptoms at any time, please call 911 or go to the emergency department for evaluation.    I ordered some xrays, please go to our radiology department to get your xrays.    I sent your prescriptions to your pharmacy.    I ordered an esophogram to investigate your difficulty swallowing, please call diagnostic imaging [phone] to schedule your test AS NEEDED.    I made a referral to pain clinic AS NEEDED for your neuropathy. They will be calling in approximately 1 week to set up your appointment.  If you do not hear from them, please call the specialty number on your after visit summary.     Please allow 1-2 business days for our office to contact you in regards to your laboratory/radiological studies.  If not done so, I encourage you to login into [employer] ([email or web address] to review your results as well.     If you have any questions or concerns, please feel free to call us at [phone]    Sincerely,    Riley Zhao PA-C    Did you know?      You can schedule a video visit for follow-up appointments as well as future appointments for certain conditions.  Please see the below link.     [email or web address]    If you have not already done so,  I encourage you to sign up for [employer] ([email or web address]).  This will allow you to review your results, securely communicate with a provider, and schedule virtual visits as well.      Patient Education     Acute Bronchitis  Your healthcare provider has told you that you have acute bronchitis. Bronchitis is infection or inflammation of the airways in the lungs (bronchial tubes). Normally, air moves easily in and out of the airways. Bronchitis narrows the airways. This  makes it harder for air to flow in and out of the lungs. This causes symptoms such as shortness of breath, coughing up yellow or green mucus, and wheezing.  Bronchitis can be acute or chronic. Acute means it happens quickly and goes away in a short time. Chronic means a condition lasts a long time and often comes back. Most people with acute bronchitis get better in 1 to 2 weeks.     What causes acute bronchitis?  Acute bronchitis is often caused by a virus such as a cold or the flu. In some cases, it may be caused by bacteria. Certain factors make it more likely for a cold or flu to turn into bronchitis. These include being very young, being elderly, having a heart or lung problem, or having a weak immune system. Cigarette smoking also makes bronchitis more likely.  When bronchitis develops, the airways become swollen. The airways may also become infected with bacteria. This is known as a secondary infection.  Symptoms of acute bronchitis  Symptoms can include:    Coughing with mucus    Wheezing    Feeling short of breath    Chest pain    Fever  Diagnosing acute bronchitis  Your healthcare provider will ask about your symptoms and health history. He or she will give you a physical exam. This will include listening to your lungs while you breathe. You may have a chest X-ray to look for infection in the lungs (pneumonia) if you have had a fever. You may also have blood tests to check for infection.  Treating acute bronchitis  Bronchitis usually goes away in 1 to 2 weeks without treatment. You can help feel better by:    Taking medicine as directed. Talk to your healthcare provider before taking any over-the-counter medicines (OTC). Some OTC medicines help relieve inflammation in your bronchial tubes. They can also thin mucus. This makes it easier to cough up. Your healthcare provider may prescribe an inhaler to help open up the bronchial tubes. Most of the time, acute bronchitis is caused by a viral infection.  Antibiotics are usually not prescribed for viral infections.    Drinking plenty of fluids, such as water, juice, or warm soup. Fluids loosen mucus so that you can cough it up. This helps you breathe more easily. Fluids also prevent dehydration.    Using a humidifier. This can help reduce coughing.    Getting plenty of rest    Not smoking. Also, don't let anyone else smoke in your home. In public places, move away from secondhand smoke.  Recovery and follow-up  Follow up with your doctor. You will likely feel better in 1 to 2 weeks. But you may have a dry cough for a longer time. Let your doctor know if you still have symptoms other than a dry cough after 2 weeks. Tell him or her if you get bronchial infections often.  Self-care tips  To get relief from your symptoms and prevent bronchitis:    Stop smoking. Stopping smoking is the most important step you can take to treat bronchitis. If you need help stopping smoking, talk with your healthcare provider.    Stay away from secondhand smoke and other irritants. Try to stay away from smoke, chemicals, fumes, and dust. Don t let anyone smoke in your home. Stay indoors on smoggy days.    Prevent lung infections. Ask your healthcare provider about the flu and pneumonia vaccines. Take steps to prevent colds and other lung infections.    Wash your hands well. Wash your hands often with soap and water. Use hand  when you can t wash your hands. Stay away from crowds during cold and flu season.  When to call your healthcare provider  Call the healthcare provider if you have any of these:    Fever of 100.4 F ( 38.0 C) or higher, or as directed by your healthcare provider    Symptoms that get worse, or new symptoms    Breathing not getting better with treatments    Symptoms that don t start to get better in 1 week  [employer] last reviewed this educational content on 8/1/2019 2000-2021 [employer]. All rights reserved. This information is not intended as a  substitute for professional medical care. Always follow your healthcare professional's instructions.

## 2021-07-21 NOTE — LETTER
July 22, 2021      Aung Sidhu  [address] [address]        Dear ,    We are writing to inform you of your test results.    Your test results fall within the expected range(s) or remain unchanged from previous results.  Please continue with current treatment plan. No COVID or pneumonia.    Results for orders placed or performed in visit on 07/21/21   XR Chest 2 Views     Status: None    Narrative    CHEST TWO VIEWS  7/21/2021 8:11 AM     HISTORY: 70-year-old patient with history of cough.       Impression    IMPRESSION: Since March 30, 2017, heart size is normal. No pleural  effusion, pneumothorax, or abnormal area of consolidation.    EMMANUELLE GUALLPA MD         SYSTEM ID:  [id number]   Results for orders placed or performed in visit on 07/21/21   SARS-COV2 (COVID-19) Virus RT-PCR     Status: Normal    Specimen: Nasopharyngeal; Swab   Result Value Ref Range    SARS CoV2 PCR Negative Negative    Narrative    Testing was performed using the [employer] SARS-CoV-2 Assay on the  [employer] Instrument System. Additional information about this  Emergency Use Authorization (EUA) assay can be found via the Lab  Guide. This test should be ordered for the detection of SARS-CoV-2 in  individuals who meet SARS-CoV-2 clinical and/or epidemiological  criteria. Test performance is unknown in asymptomatic patients. This  test is for in vitro diagnostic use under the FDA EUA for  laboratories certified under CLIA to perform high complexity testing.  This test has not been FDA cleared or approved. A negative result  does not rule out the presence of PCR inhibitors in the specimen or  target RNA in concentration below the limit of detection for the  assay. The possibility of a false negative should be considered if  the patient's recent exposure or clinical presentation suggests  COVID-19. This test was validated by the [facility]. This laboratory is  certified under  the Clinical Laboratory Improvement Amendments of 1988 (CLIA-88) as  qualified to perform high complexity laboratory testing.   Symptomatic COVID-19 Virus (Coronavirus) by PCR Nasopharyngeal     Status: Normal    Specimen: Nasopharyngeal; Swab    Narrative    The following orders were created for panel order Symptomatic COVID-19 Virus (Coronavirus) by PCR Nasopharyngeal.  Procedure                               Abnormality         Status                     ---------                               -----------         ------                     SARS-COV2 (COVID-19) Vir...[[id number]]  Normal              Final result                 Please view results for these tests on the individual orders.       If you have any questions or concerns, please call the clinic at the number listed above.       Sincerely,      CARRIE Ramirez

## 2021-07-23 ENCOUNTER — TELEPHONE (OUTPATIENT)
Dept: PALLIATIVE MEDICINE | Facility: CLINIC | Age: 71
End: 2021-07-23

## 2021-07-23 NOTE — LETTER
July 23, 2021    Aung Sidhu  [address] [facility] [address]    Dear Kevyn Horan to the [facility].  We are located on the 2nd floor ([address]) of the [facility], located at [address] [place] [id number].  Your appointment has been scheduled on Monday August 30, 2021 at 10:00a with Jake Caldwell MD.    At your first visit, you will meet your team of caregivers who will help you to develop pain management strategies that will last a lifetime. You will meet with our support staff to review your insurance information, and collect your co-payment if required by your insurance company. You will also meet with a medical pain specialist and care coordinator who will assess your pain and develop a plan of care for your successful pain rehabilitation. You should expect to spend approximately 1 hour at your first visit with us.    To help us make your visit go as smoothly as possible, please bring the following items with you on your visit:       Completed Pain Questionnaire enclosed in this packet.  If you do not bring the completed questionnaire, we may have to reschedule your appointment.    List of any medicines that you are currently taking or have been prescribed    Important NON-[place] medical information such as medical records or tests results (X-rays, or laboratory tests)    Your health insurance card    Financial resources to cover your co-payment or balance due at the time of service (cash, personal check, Visa, and MasterCard are acceptable methods of payment)     Due to the demand for new patient evaluations, you must notify the scheduling department 48 hours (2 days) in advance if you are not able to keep this appointment. Failure to do so could affect your ability to reschedule with our clinic. Please be aware that we will not prescribe any medications at your first visit.     Please call [phone] with any questions  regarding your appointment. We look forward to meeting you and working to address your health care needs.     Sincerely,      [facility]

## 2021-07-23 NOTE — TELEPHONE ENCOUNTER

## 2021-08-20 DIAGNOSIS — G60.9 IDIOPATHIC PERIPHERAL NEUROPATHY: ICD-10-CM

## 2021-08-20 RX ORDER — GABAPENTIN 300 MG/1
CAPSULE ORAL
Qty: 30 CAPSULE | Refills: 1 | Status: SHIPPED | OUTPATIENT
Start: 2021-08-20 | End: 2021-12-10

## 2021-08-20 NOTE — TELEPHONE ENCOUNTER
Requested Prescriptions   Pending Prescriptions Disp Refills     gabapentin (NEURONTIN) 300 MG capsule [Pharmacy Med Name: GABAPENTIN 300MG CAPS] 30 capsule 1     Sig: TAKE ONE CAPSULE BY MOUTH AT BEDTIME     Last Written Prescription Date:  07/20/2021  Last Fill Quantity: 30,   # refills: 1  Last Office Visit: 10/15/2020  Future Office visit:       Routing refill request to provider for review/approval because:  Drug not on the G, P or [facility] refill protocol or controlled substance

## 2021-08-27 DIAGNOSIS — I10 BENIGN ESSENTIAL HYPERTENSION: ICD-10-CM

## 2021-08-27 RX ORDER — LISINOPRIL 10 MG/1
TABLET ORAL
Qty: 90 TABLET | Refills: 1 | Status: SHIPPED | OUTPATIENT
Start: 2021-08-27 | End: 2022-03-17

## 2021-08-27 NOTE — TELEPHONE ENCOUNTER
Routing refill request to provider for review/approval because:  Elevated BP of file.     Loida De Paz RN

## 2021-08-30 ENCOUNTER — OFFICE VISIT (OUTPATIENT)
Dept: PALLIATIVE MEDICINE | Facility: CLINIC | Age: 71
End: 2021-08-30
Attending: PHYSICIAN ASSISTANT
Payer: COMMERCIAL

## 2021-08-30 VITALS — DIASTOLIC BLOOD PRESSURE: 87 MMHG | SYSTOLIC BLOOD PRESSURE: 143 MMHG | HEART RATE: 77 BPM

## 2021-08-30 DIAGNOSIS — M79.2 NEUROPATHIC PAIN: Primary | ICD-10-CM

## 2021-08-30 DIAGNOSIS — G60.9 IDIOPATHIC PERIPHERAL NEUROPATHY: ICD-10-CM

## 2021-08-30 PROCEDURE — 99204 OFFICE O/P NEW MOD 45 MIN: CPT | Performed by: PAIN MEDICINE

## 2021-08-30 RX ORDER — LIDOCAINE/PRILOCAINE 2.5 %-2.5%
CREAM (GRAM) TOPICAL DAILY
Qty: 30 G | Refills: 1 | Status: SHIPPED | OUTPATIENT
Start: 2021-08-30 | End: 2021-08-30

## 2021-08-30 RX ORDER — LIDOCAINE/PRILOCAINE 2.5 %-2.5%
CREAM (GRAM) TOPICAL DAILY
Qty: 30 G | Refills: 1 | Status: SHIPPED | OUTPATIENT
Start: 2021-08-30

## 2021-08-30 RX ORDER — GABAPENTIN 100 MG/1
100 CAPSULE ORAL 2 TIMES DAILY
Qty: 60 CAPSULE | Refills: 0 | Status: SHIPPED | OUTPATIENT
Start: 2021-08-30 | End: 2021-09-27

## 2021-08-30 ASSESSMENT — PAIN SCALES - GENERAL: PAINLEVEL: MILD PAIN (3)

## 2021-08-30 NOTE — PATIENT INSTRUCTIONS
- Further procedures recommended: no  - Medication Management:  Will send recommendations to PCP given order for consult only. Discussed long term goal with treating peripheral neuropathy/neuropathic pain.    - discussed changing overall gabapentin regimen in order to improve pain control during the day. Additionally, goal is to limit side effects   - 100mg twice a day instead of 300mg at night. Would gradually titrate as tolerated. Next change would be either 100mg three times a day or 200mg twice a day depending on patient preference.   - order placed for trial of emla cream    - may consider other therapies if not improving with titration    - Physical Therapy: no  - Clinical Health Psychologist to address issues of relaxation, behavioral change, coping style, and other factors important to improvement: no   - Follow up:    - as needed or for further assistance with titration    - would consider lab workup for other possible causes of neuropathy       ----------------------------------------------------------------  Clinic Number:  [phone]     Call with any questions about your care and for scheduling assistance.     Calls are returned Monday through Friday between 8 AM and 4:30 PM. We usually get back to you within 2 business days depending on the issue/request.    If we are prescribing your medications:    For opioid medication refills, call the clinic or send a [employer] message 7 days in advance.  Please include:    Name of requested medication    Name of the pharmacy.    For non-opioid medications, call your pharmacy directly to request a refill. Please allow 3-4 days to be processed.     Per [place] State Law:    All controlled substance prescriptions must be filled within 30 days of being written.      For those controlled substances allowing refills, pickup must occur within 30 days of last fill.      We believe regular attendance is key to your success in our program!      Any time you are unable to keep  your appointment we ask that you call us at least 24 hours in advance to cancel.This will allow us to offer the appointment time to another patient.     Multiple missed appointments may lead to dismissal from the clinic.

## 2021-08-30 NOTE — PROGRESS NOTES
[facility] consult only    Date of visit: 8/30/2021    Reason for consultation:    Primary Care Provider is Delfino Ferguson  Pain medications are being prescribed by N/A    Please see the [facility] health questionnaire which the patient completed and reviewed with me in detail.    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  The symptoms are constent  The pt reports a tingling/burningnumbness  The pt reports feels like he is walking a sergio   The pain is worse with prolonged standing  The pain is worse with prolonged walking  Worse with prolonged activity  The symptoms are worse at night  No association with gravity  Benefit with sox  Benefit with massage  Beneift with warm water  Benefit with shoes  No back pain   No overt weakness  No incontinence  No allodynia   NO lesions on his feet      The pt reports when he started gabapentin at night 300mg he felt fatigue during the day     The pt reports he is able to sleep at night with gabapentin     Of note the pt has not started gabapentin during the day  Pain sig affects his quality of life and desire to pursue the activities that bring enjoyment    Pain rating: intensity  Averages 5/10 on a 0-10 scale.    Current treatments include:  Gabapentin low-dose  Previous medication treatments included:  N/a    Other treatments have included:  Aung Sidhu has not been seen at a pain clinic in the past.    PT: n  Chiropractic: n  Acupuncture: n  TENs Unit: n  Injections: n    Past Medical History:  Past Medical History:   Diagnosis Date     Malignant neoplasm of  prostate (H)      Meniere disease      Motion sickness      PONV (postoperative nausea and vomiting)      Past Surgical History:  Past Surgical History:   Procedure Laterality Date     ARTHROSCOPY KNEE WITH DEBRIDEMENT JOINT, COMBINED Right 12/15/2016    Procedure: ARTHROSCOPY KNEE WITH DEBRIDEMENT JOINT;  Surgeon: Greg Morgan MD;  Location: [facility] OR     C REMV PROSTATE,RETROPUB,RADICAL  06/27/2007    [facility]     COLONOSCOPY N/A 8/27/2018    Procedure: COLONOSCOPY;  colonoscopy;  Surgeon: Esequiel Salazar MD;  Location: [facility] GI     HC INTERSTITIAL RAD SOURCE JESUS ALBERTO, INTERMEDIATE  1998    ganglian cyst     HC REMOVAL OF TONSILS,<14 Y/O      Tonsils <13y.o.     HC TYMPANOPLASTY W/O MASTOID, INIT/REV W/O OSS CHAIN RECONST  1991     [facility]  [facility]19[facility]     Medications:  Current Outpatient Medications   Medication Sig Dispense Refill     cetirizine (KLS ALLER-MIKAEL) 10 MG tablet Take 10 mg by mouth daily        gabapentin (NEURONTIN) 100 MG capsule TAKE ONE CAPSULE BY MOUTH AT BEDTIME 30 capsule 1     gabapentin (NEURONTIN) 300 MG capsule TAKE ONE CAPSULE BY MOUTH AT BEDTIME 30 capsule 1     lidocaine-prilocaine (EMLA) 2.5-2.5 % external cream Apply topically daily 30 g 1     lisinopril (ZESTRIL) 10 MG tablet TAKE ONE TABLET BY MOUTH ONCE DAILY 90 tablet 1     Ibuprofen-diphenhydrAMINE Cit (ADVIL PM) 200-38 MG TABS Take 1 tablet by mouth nightly as needed (Patient not taking: Reported on 7/19/2021)       omeprazole (PRILOSEC) 20 MG DR capsule Take 10 mg by mouth daily (Patient not taking: Reported on 8/30/2021)       sildenafil (REVATIO) 20 MG tablet Take 20 mg by mouth 3 times daily (Patient not taking: Reported on 7/19/2021)       triamcinolone (ARISTOCORT HP) 0.5 % external cream Apply topically 2 times daily To knees (Patient not taking: Reported on 7/19/2021) 30 g 1     Allergies:     Allergies   Allergen Reactions     No Known Drug Allergies      Social History:    History of  "chemical dependency treatment: n    Family history:  Family History   Problem Relation Age of Onset     Hypertension Mother      Allergies Mother         seasonal     Anesthesia Reaction Mother         n and v     Arthritis Mother      Breast Cancer Mother      Eye Disorder Mother         cataract     Lipids Mother      Obesity Mother      Genitourinary Problems Father         kidneys stopped working, on dialaysis     Allergies Father         seasonal     Alcohol/Drug Father      Cerebrovascular Disease Paternal Grandmother      Family history of headaches: n    Review of Systems:  Skin: negative  Eyes: negative  Ears/Nose/Throat: negative  Respiratory: No shortness of breath, dyspnea on exertion, cough, or hemoptysis  Cardiovascular: negative  Gastrointestinal: negative  Genitourinary: negative  Musculoskeletal: negative  Neurologic: negative  Psychiatric: negative  Hematologic/Lymphatic/Immunologic: negative  Endocrine: negative    Physical Exam:  Vitals:    08/30/21 0958   BP: (!) 143/87   Pulse: 77     Exam:  Constitutional: healthy, alert and no distress  Head: normocephalic. Atraumatic.   Eyes: no redness or jaundice noted   ENT: oropharnx normal.  MMM.  Neck supple.    Cardiovascular: Negative JVD  Respiratory: Speaking in full sentences no accessory muscles use     Skin: no suspicious lesions or rashes  Psychiatric: mentation appears normal and affect normal/bright    Musculoskeletal exam:  Gait/Station/Posture: wnl  Cervical spine: ROMwnl       Thoracic spine:  Normal     Lumbar spine:     ROM: wnl               Straight leg exam: neg    Neurologic exam:  CN:  Cranial nerves 2-12 are normal  Motor:  5/5Le  Reflexes:        Achilles:  +2    Sensory:  (upper and lower extremities):   Light touch: decreaes bilat to mid foot   Allodynia: absent    Dysethesia: ++++ bilat plantar surface   Hyperalgesia: absent     Diagnostic tests:\"        Assessment/Plan:  Aung Sidhu is a 70 year old male who presents with " the complaints of bilateral foot pain.   Aung was seen today for pain.    Diagnoses and all orders for this visit:    Neuropathic pain  -     lidocaine-prilocaine (EMLA) 2.5-2.5 % external cream; Apply topically daily    Idiopathic peripheral neuropathy  -     Pain Management Referral  -     lidocaine-prilocaine (EMLA) 2.5-2.5 % external cream; Apply topically daily         - Further procedures recommended: no  - Medication Management:  Will send recommendations to PCP given order for consult only. Discussed long term goal with treating peripheral neuropathy/neuropathic pain.    - discussed changing overall gabapentin regimen in order to improve pain control during the day. Additionally, goal is to limit side effects   - 100mg twice a day instead of 300mg at night. Would gradually titrate as tolerated. Next change would be either 100mg three times a day or 200mg twice a day depending on patient preference.   - order placed for trial of emla cream    - may consider other therapies if not improving with titration    - Physical Therapy: no  - Clinical Health Psychologist to address issues of relaxation, behavioral change, coping style, and other factors important to improvement: no   - Follow up:    - as needed or for further assistance with titration    - would consider lab workup for other possible causes of neuropathy       The total TIME spent on this patient on the day of the appointment was 30 minutes.   Time spent preparing to see the patient (reviewing records and tests)  Time spend face to face with the patient  Time spent ordering tests, medications, procedures and referrals  Time spent Referring and communicating with other healthcare professionals  Documenting clinical information in Epic    Presley Caldwell MD  [facility]  This note was created with voice recognition software, and while reviewed for accuracy, typos may remain.

## 2021-09-27 DIAGNOSIS — G60.9 IDIOPATHIC PERIPHERAL NEUROPATHY: ICD-10-CM

## 2021-09-27 DIAGNOSIS — M79.2 NEUROPATHIC PAIN: ICD-10-CM

## 2021-09-27 RX ORDER — GABAPENTIN 100 MG/1
100 CAPSULE ORAL 2 TIMES DAILY
Qty: 60 CAPSULE | Refills: 0 | Status: SHIPPED | OUTPATIENT
Start: 2021-09-27 | End: 2022-02-07

## 2021-09-27 NOTE — TELEPHONE ENCOUNTER
Received fax request from [facility]'s pharmacy requesting refill(s) for gabapentin (NEURONTIN) 100 MG capsule    Last refilled on 08/31/21    Pt last seen on 08/30/21  Next appt scheduled for : none    Will facilitate refill.

## 2021-10-18 ENCOUNTER — OFFICE VISIT (OUTPATIENT)
Dept: FAMILY MEDICINE | Facility: CLINIC | Age: 71
End: 2021-10-18
Payer: COMMERCIAL

## 2021-10-18 VITALS
RESPIRATION RATE: 18 BRPM | OXYGEN SATURATION: 97 % | HEIGHT: 71 IN | DIASTOLIC BLOOD PRESSURE: 62 MMHG | WEIGHT: 183.7 LBS | SYSTOLIC BLOOD PRESSURE: 124 MMHG | TEMPERATURE: 97.6 F | BODY MASS INDEX: 25.72 KG/M2 | HEART RATE: 77 BPM

## 2021-10-18 DIAGNOSIS — Z00.00 ENCOUNTER FOR MEDICARE ANNUAL WELLNESS EXAM: Primary | ICD-10-CM

## 2021-10-18 DIAGNOSIS — I10 BENIGN ESSENTIAL HYPERTENSION: ICD-10-CM

## 2021-10-18 DIAGNOSIS — Z12.5 SCREENING FOR PROSTATE CANCER: ICD-10-CM

## 2021-10-18 LAB
ALBUMIN SERPL-MCNC: 3.7 G/DL (ref 3.4–5)
ALP SERPL-CCNC: 80 U/L (ref 40–150)
ALT SERPL W P-5'-P-CCNC: 36 U/L (ref 0–70)
ANION GAP SERPL CALCULATED.3IONS-SCNC: 3 MMOL/L (ref 3–14)
AST SERPL W P-5'-P-CCNC: 18 U/L (ref 0–45)
BILIRUB SERPL-MCNC: 1 MG/DL (ref 0.2–1.3)
BUN SERPL-MCNC: 14 MG/DL (ref 7–30)
CALCIUM SERPL-MCNC: 8.7 MG/DL (ref 8.5–10.1)
CHLORIDE BLD-SCNC: 105 MMOL/L (ref 94–109)
CHOLEST SERPL-MCNC: 159 MG/DL
CO2 SERPL-SCNC: 27 MMOL/L (ref 20–32)
CREAT SERPL-MCNC: 1.25 MG/DL (ref 0.66–1.25)
FASTING STATUS PATIENT QL REPORTED: YES
GFR SERPL CREATININE-BSD FRML MDRD: 58 ML/MIN/1.73M2
GLUCOSE BLD-MCNC: 114 MG/DL (ref 70–99)
HDLC SERPL-MCNC: 41 MG/DL
LDLC SERPL CALC-MCNC: 86 MG/DL
NONHDLC SERPL-MCNC: 118 MG/DL
POTASSIUM BLD-SCNC: 4.8 MMOL/L (ref 3.4–5.3)
PROT SERPL-MCNC: 7.4 G/DL (ref 6.8–8.8)
PSA SERPL-MCNC: <0.01 UG/L (ref 0–4)
SODIUM SERPL-SCNC: 135 MMOL/L (ref 133–144)
TRIGL SERPL-MCNC: 160 MG/DL

## 2021-10-18 PROCEDURE — G0103 PSA SCREENING: HCPCS | Performed by: FAMILY MEDICINE

## 2021-10-18 PROCEDURE — 36415 COLL VENOUS BLD VENIPUNCTURE: CPT | Performed by: FAMILY MEDICINE

## 2021-10-18 PROCEDURE — 80053 COMPREHEN METABOLIC PANEL: CPT | Performed by: FAMILY MEDICINE

## 2021-10-18 PROCEDURE — 99397 PER PM REEVAL EST PAT 65+ YR: CPT | Performed by: FAMILY MEDICINE

## 2021-10-18 PROCEDURE — 80061 LIPID PANEL: CPT | Performed by: FAMILY MEDICINE

## 2021-10-18 ASSESSMENT — PAIN SCALES - GENERAL: PAINLEVEL: NO PAIN (0)

## 2021-10-18 ASSESSMENT — MIFFLIN-ST. JEOR: SCORE: 1619.35

## 2021-10-18 NOTE — PROGRESS NOTES
"    Assessment & Plan     Encounter for Medicare annual wellness exam  Doing well no particular problems does complain of some sinus type drainage that he coughs up in the morning and then he clears for the rest of the day.  Takes Zyrtec.  - Lipid panel reflex to direct LDL Fasting; Future    Benign essential hypertension  Continue on his lisinopril.  Also on gabapentin for peripheral neuropathy.  Getting lab work today we will notify with results.  - Comprehensive metabolic panel (BMP + Alb, Alk Phos, ALT, AST, Total. Bili, TP); Future    Screening for prostate cancer  He has had prostatectomy we will check a PSA on him.  - PSA, screen; Future             BMI:   Estimated body mass index is 25.44 kg/m  as calculated from the following:    Height as of this encounter: 1.81 m (5' 11.25\").    Weight as of this encounter: 83.3 kg (183 lb 11.2 oz).           Return in about 53 weeks (around 10/24/2022) for Annual Wellness Visit.    Delfino Ferguson MD  [facility] CHRISTOPHE Horan is a 71 year old who presents for the following health issues     Healthy Habits:   PHQ-2 Total Score: 0             Review of Systems   Constitutional, HEENT, cardiovascular, pulmonary, GI, , musculoskeletal, neuro, skin, endocrine and psych systems are negative, except as otherwise noted.      Objective    /62   Pulse 77   Temp 97.6  F (36.4  C) (Temporal)   Resp 18   Ht 1.81 m (5' 11.25\")   Wt 83.3 kg (183 lb 11.2 oz)   SpO2 97%   BMI 25.44 kg/m    Body mass index is 25.44 kg/m .  Physical Exam   GENERAL: healthy, alert and no distress  EYES: Eyes grossly normal to inspection, PERRL and conjunctivae and sclerae normal  HENT: ear canals and TM's normal, nose and mouth without ulcers or lesions  NECK: no adenopathy, no asymmetry, masses, or scars and thyroid normal to palpation  RESP: lungs clear to auscultation - no rales, rhonchi or wheezes  CV: regular rate and rhythm, normal S1 S2, no S3 or " S4, no murmur, click or rub, no peripheral edema and peripheral pulses strong  ABDOMEN: soft, nontender, no hepatosplenomegaly, no masses and bowel sounds normal  MS: no gross musculoskeletal defects noted, no edema  SKIN: no suspicious lesions or rashes  NEURO: Normal strength and tone, mentation intact and speech normal  PSYCH: mentation appears normal, affect normal/bright    Results for orders placed or performed in visit on 10/18/21 (from the past 24 hour(s))   Comprehensive metabolic panel (BMP + Alb, Alk Phos, ALT, AST, Total. Bili, TP)   Result Value Ref Range    Sodium 135 133 - 144 mmol/L    Potassium 4.8 3.4 - 5.3 mmol/L    Chloride 105 94 - 109 mmol/L    Carbon Dioxide (CO2) 27 20 - 32 mmol/L    Anion Gap 3 3 - 14 mmol/L    Urea Nitrogen 14 7 - 30 mg/dL    Creatinine 1.25 0.66 - 1.25 mg/dL    Calcium 8.7 8.5 - 10.1 mg/dL    Glucose 114 (H) 70 - 99 mg/dL    Alkaline Phosphatase 80 40 - 150 U/L    AST 18 0 - 45 U/L    ALT 36 0 - 70 U/L    Protein Total 7.4 6.8 - 8.8 g/dL    Albumin 3.7 3.4 - 5.0 g/dL    Bilirubin Total 1.0 0.2 - 1.3 mg/dL    GFR Estimate 58 (L) >60 mL/min/1.73m2   Lipid panel reflex to direct LDL Fasting   Result Value Ref Range    Cholesterol 159 <200 mg/dL    Triglycerides 160 (H) <150 mg/dL    Direct Measure HDL 41 >=40 mg/dL    LDL Cholesterol Calculated 86 <=100 mg/dL    Non HDL Cholesterol 118 <130 mg/dL    Patient Fasting > 8hrs? Yes     Narrative    Cholesterol  Desirable:  <200 mg/dL    Triglycerides  Normal:  Less than 150 mg/dL  Borderline High:  150-199 mg/dL  High:  200-499 mg/dL  Very High:  Greater than or equal to 500 mg/dL    Direct Measure HDL  Female:  Greater than or equal to 50 mg/dL   Male:  Greater than or equal to 40 mg/dL    LDL Cholesterol  Desirable:  <100mg/dL  Above Desirable:  100-129 mg/dL   Borderline High:  130-159 mg/dL   High:  160-189 mg/dL   Very High:  >= 190 mg/dL    Non HDL Cholesterol  Desirable:  130 mg/dL  Above Desirable:  130-159  "mg/dL  Borderline High:  160-189 mg/dL  High:  190-219 mg/dL  Very High:  Greater than or equal to 220 mg/dL   PSA, screen   Result Value Ref Range    Prostate Specific Antigen Screen <0.01 0.00 - 4.00 ug/L    Narrative    Assay Method:  Chemiluminescence using Siemens   Vista analyzer.               SUBJECTIVE:   Aung Sidhu is a 71 year old male who presents for Preventive Visit.      Patient has been advised of split billing requirements and indicates understanding: Yes  Are you in the first 12 months of your Medicare Part B coverage?  No    Physical Health:    In general, how would you rate your overall physical health? fair    Outside of work, how many days during the week do you exercise? none    Outside of work, approximately how many minutes a day do you exercise?less than 15 minutes    If you drink alcohol do you typically have >3 drinks per day or >7 drinks per week? No    Do you usually eat at least 4 servings of fruit and vegetables a day, include whole grains & fiber and avoid regularly eating high fat or \"junk\" foods? NO    Do you have any problems taking medications regularly?  No    Do you have any side effects from medications? no    Needs assistance for the following daily activities: no assistance needed    Which of the following safety concerns are present in your home?  No concern     Hearing impairment: No    In the past 6 months, have you been bothered by leaking of urine? no    Mental Health:    In general, how would you rate your overall mental or emotional health? good  PHQ-2 Score: 0    Do you feel safe in your environment? Yes    Have you ever done Advance Care Planning? (For example, a Health Directive, POLST, or a discussion with a medical provider or your loved ones about your wishes): No, advance care planning information given to patient to review.  Patient plans to discuss their wishes with loved ones or provider.      Additional concerns to address?  No    Fall risk:  Fallen 2 " or more times in the past year?: No  Any fall with injury in the past year?: No    Cognitive Screenin) Repeat 3 items (Leader, Season, Table)    2) Clock draw: NORMAL  3) 3 item recall: Recalls 2 objects   Results: NORMAL clock, 1-2 items recalled: COGNITIVE IMPAIRMENT LESS LIKELY    Mini-CogTM Copyright DAVIDSON Tinoco. Licensed by the author for use in [facility]; reprinted with permission ([email or web address]@[facility]). All rights reserved.      Do you have sleep apnea, excessive snoring or daytime drowsiness?: no            Reviewed and updated as needed this visit by clinical staff  Tobacco  Allergies  Meds   Med Hx  Surg Hx  Fam Hx  Soc Hx        Reviewed and updated as needed this visit by Provider                Social History     Tobacco Use     Smoking status: Former Smoker     Smokeless tobacco: Never Used     Tobacco comment: quit in    Substance Use Topics     Alcohol use: Yes     Comment: valeria                           Current providers sharing in care for this patient include:   Patient Care Team:  Delfino Ferguson MD as PCP - General  Delfino Ferguson MD as Assigned PCP    The following health maintenance items are reviewed in Epic and correct as of today:  Health Maintenance   Topic Date Due     ANNUAL REVIEW OF HM ORDERS  Never done     ZOSTER IMMUNIZATION (1 of 2) Never done     AORTIC ANEURYSM SCREENING (SYSTEM ASSIGNED)  Never done     Pneumococcal Vaccine: 65+ Years (2 of 2 - PPSV23) 2018     DTAP/TDAP/TD IMMUNIZATION (2 - Td or Tdap) 2019     FALL RISK ASSESSMENT  10/15/2021     MEDICARE ANNUAL WELLNESS VISIT  10/18/2022     LIPID  10/15/2025     ADVANCE CARE PLANNING  10/18/2026     COLORECTAL CANCER SCREENING  2028     HEPATITIS C SCREENING  Completed     PHQ-2  Completed     INFLUENZA VACCINE  Completed     COVID-19 Vaccine  Completed     IPV IMMUNIZATION  Aged Out     MENINGITIS IMMUNIZATION  Aged Out     HEPATITIS B IMMUNIZATION  Aged Out  "          ROS:  Constitutional, HEENT, cardiovascular, pulmonary, GI, , musculoskeletal, neuro, skin, endocrine and psych systems are negative, except as otherwise noted.    OBJECTIVE:   /62   Pulse 77   Temp 97.6  F (36.4  C) (Temporal)   Resp 18   Ht 1.81 m (5' 11.25\")   Wt 83.3 kg (183 lb 11.2 oz)   SpO2 97%   BMI 25.44 kg/m   Estimated body mass index is 25.44 kg/m  as calculated from the following:    Height as of this encounter: 1.81 m (5' 11.25\").    Weight as of this encounter: 83.3 kg (183 lb 11.2 oz).  EXAM:   GENERAL: healthy, alert and no distress  EYES: Eyes grossly normal to inspection, PERRL and conjunctivae and sclerae normal  HENT: ear canals and TM's normal, nose and mouth without ulcers or lesions  NECK: no adenopathy, no asymmetry, masses, or scars and thyroid normal to palpation  RESP: lungs clear to auscultation - no rales, rhonchi or wheezes  CV: regular rate and rhythm, normal S1 S2, no S3 or S4, no murmur, click or rub, no peripheral edema and peripheral pulses strong  ABDOMEN: soft, nontender, no hepatosplenomegaly, no masses and bowel sounds normal  MS: no gross musculoskeletal defects noted, no edema  SKIN: no suspicious lesions or rashes  NEURO: Normal strength and tone, mentation intact and speech normal  PSYCH: mentation appears normal, affect normal/bright    Diagnostic Test Results:  Labs reviewed in Epic  Results for orders placed or performed in visit on 10/18/21 (from the past 24 hour(s))   Comprehensive metabolic panel (BMP + Alb, Alk Phos, ALT, AST, Total. Bili, TP)   Result Value Ref Range    Sodium 135 133 - 144 mmol/L    Potassium 4.8 3.4 - 5.3 mmol/L    Chloride 105 94 - 109 mmol/L    Carbon Dioxide (CO2) 27 20 - 32 mmol/L    Anion Gap 3 3 - 14 mmol/L    Urea Nitrogen 14 7 - 30 mg/dL    Creatinine 1.25 0.66 - 1.25 mg/dL    Calcium 8.7 8.5 - 10.1 mg/dL    Glucose 114 (H) 70 - 99 mg/dL    Alkaline Phosphatase 80 40 - 150 U/L    AST 18 0 - 45 U/L    ALT 36 0 - 70 " "U/L    Protein Total 7.4 6.8 - 8.8 g/dL    Albumin 3.7 3.4 - 5.0 g/dL    Bilirubin Total 1.0 0.2 - 1.3 mg/dL    GFR Estimate 58 (L) >60 mL/min/1.73m2   Lipid panel reflex to direct LDL Fasting   Result Value Ref Range    Cholesterol 159 <200 mg/dL    Triglycerides 160 (H) <150 mg/dL    Direct Measure HDL 41 >=40 mg/dL    LDL Cholesterol Calculated 86 <=100 mg/dL    Non HDL Cholesterol 118 <130 mg/dL    Patient Fasting > 8hrs? Yes     Narrative    Cholesterol  Desirable:  <200 mg/dL    Triglycerides  Normal:  Less than 150 mg/dL  Borderline High:  150-199 mg/dL  High:  200-499 mg/dL  Very High:  Greater than or equal to 500 mg/dL    Direct Measure HDL  Female:  Greater than or equal to 50 mg/dL   Male:  Greater than or equal to 40 mg/dL    LDL Cholesterol  Desirable:  <100mg/dL  Above Desirable:  100-129 mg/dL   Borderline High:  130-159 mg/dL   High:  160-189 mg/dL   Very High:  >= 190 mg/dL    Non HDL Cholesterol  Desirable:  130 mg/dL  Above Desirable:  130-159 mg/dL  Borderline High:  160-189 mg/dL  High:  190-219 mg/dL  Very High:  Greater than or equal to 220 mg/dL   PSA, screen   Result Value Ref Range    Prostate Specific Antigen Screen <0.01 0.00 - 4.00 ug/L    Narrative    Assay Method:  Chemiluminescence using Siemens   Vista analyzer.       ASSESSMENT / PLAN:       Patient has been advised of split billing requirements and indicates understanding: Yes    COUNSELING:  Reviewed preventive health counseling, as reflected in patient instructions       Regular exercise       Healthy diet/nutrition       Vision screening    Estimated body mass index is 25.44 kg/m  as calculated from the following:    Height as of this encounter: 1.81 m (5' 11.25\").    Weight as of this encounter: 83.3 kg (183 lb 11.2 oz).        He reports that he has quit smoking. He has never used smokeless tobacco.    Appropriate preventive services were discussed with this patient, including applicable screening as appropriate for " cardiovascular disease, diabetes, osteopenia/osteoporosis, and glaucoma.  As appropriate for age/gender, discussed screening for colorectal cancer, prostate cancer, breast cancer, and cervical cancer. Checklist reviewing preventive services available has been given to the patient.    Reviewed patients plan of care and provided an AVS. The Basic Care Plan (routine screening as documented in Health Maintenance) for Aung meets the Care Plan requirement. This Care Plan has been established and reviewed with the Patient.    Counseling Resources:  ATP IV Guidelines  Pooled Cohorts Equation Calculator  Breast Cancer Risk Calculator  BRCA-Related Cancer Risk Assessment: FHS-7 Tool  FRAX Risk Assessment  ICSI Preventive Guidelines  Dietary Guidelines for Americans, 2010  USDA's MyPlate  ASA Prophylaxis  Lung CA Screening    Delfino Ferguson MD  [facility]

## 2021-10-18 NOTE — LETTER
October 27, 2021      Aung Sidhu  [address] [address] [address]        Dear ,    We are writing to inform you of your test results.    Labs from last week so your PSA is again undetectable blood sugar was borderline up at 114, your cholesterol was good at 159     Resulted Orders   Comprehensive metabolic panel (BMP + Alb, Alk Phos, ALT, AST, Total. Bili, TP)   Result Value Ref Range    Sodium 135 133 - 144 mmol/L    Potassium 4.8 3.4 - 5.3 mmol/L    Chloride 105 94 - 109 mmol/L    Carbon Dioxide (CO2) 27 20 - 32 mmol/L    Anion Gap 3 3 - 14 mmol/L    Urea Nitrogen 14 7 - 30 mg/dL    Creatinine 1.25 0.66 - 1.25 mg/dL    Calcium 8.7 8.5 - 10.1 mg/dL    Glucose 114 (H) 70 - 99 mg/dL    Alkaline Phosphatase 80 40 - 150 U/L    AST 18 0 - 45 U/L    ALT 36 0 - 70 U/L    Protein Total 7.4 6.8 - 8.8 g/dL    Albumin 3.7 3.4 - 5.0 g/dL    Bilirubin Total 1.0 0.2 - 1.3 mg/dL    GFR Estimate 58 (L) >60 mL/min/1.73m2      Comment:      As of July 11, 2021, eGFR is calculated by the CKD-EPI creatinine equation, without race adjustment. eGFR can be influenced by muscle mass, exercise, and diet. The reported eGFR is an estimation only and is only applicable if the renal function is stable.   Lipid panel reflex to direct LDL Fasting   Result Value Ref Range    Cholesterol 159 <200 mg/dL    Triglycerides 160 (H) <150 mg/dL    Direct Measure HDL 41 >=40 mg/dL    LDL Cholesterol Calculated 86 <=100 mg/dL    Non HDL Cholesterol 118 <130 mg/dL    Patient Fasting > 8hrs? Yes     Narrative    Cholesterol  Desirable:  <200 mg/dL    Triglycerides  Normal:  Less than 150 mg/dL  Borderline High:  150-199 mg/dL  High:  200-499 mg/dL  Very High:  Greater than or equal to 500 mg/dL    Direct Measure HDL  Female:  Greater than or equal to 50 mg/dL   Male:  Greater than or equal to 40 mg/dL    LDL Cholesterol  Desirable:  <100mg/dL  Above Desirable:  100-129 mg/dL   Borderline High:  130-159 mg/dL   High:  160-189 mg/dL    Very High:  >= 190 mg/dL    Non HDL Cholesterol  Desirable:  130 mg/dL  Above Desirable:  130-159 mg/dL  Borderline High:  160-189 mg/dL  High:  190-219 mg/dL  Very High:  Greater than or equal to 220 mg/dL   PSA, screen   Result Value Ref Range    Prostate Specific Antigen Screen <0.01 0.00 - 4.00 ug/L    Narrative    Assay Method:  Chemiluminescence using Siemens   Vista analyzer.       If you have any questions or concerns, please call the clinic at the number listed above.       Sincerely,      Delfino Ferguson MD

## 2021-10-18 NOTE — PATIENT INSTRUCTIONS
Patient Education   Personalized Prevention Plan  You are due for the preventive services outlined below.  Your care team is available to assist you in scheduling these services.  If you have already completed any of these items, please share that information with your care team to update in your medical record.  Health Maintenance Due   Topic Date Due     ANNUAL REVIEW OF HM ORDERS  Never done     Zoster (Shingles) Vaccine (1 of 2) Never done     AORTIC ANEURYSM SCREENING (SYSTEM ASSIGNED)  Never done     Pneumococcal Vaccine (2 of 2 - PPSV23) 03/30/2018     Diptheria Tetanus Pertussis (DTAP/TDAP/TD) Vaccine (2 - Td or Tdap) 04/20/2019     FALL RISK ASSESSMENT  10/15/2021     Annual Wellness Visit  10/15/2021        Patient Education   Personalized Prevention Plan  You are due for the preventive services outlined below.  Your care team is available to assist you in scheduling these services.  If you have already completed any of these items, please share that information with your care team to update in your medical record.  Health Maintenance Due   Topic Date Due     ANNUAL REVIEW OF HM ORDERS  Never done     Zoster (Shingles) Vaccine (1 of 2) Never done     AORTIC ANEURYSM SCREENING (SYSTEM ASSIGNED)  Never done     Pneumococcal Vaccine (2 of 2 - PPSV23) 03/30/2018     Diptheria Tetanus Pertussis (DTAP/TDAP/TD) Vaccine (2 - Td or Tdap) 04/20/2019     FALL RISK ASSESSMENT  10/15/2021        
left upper extremity findings

## 2021-12-10 DIAGNOSIS — G60.9 IDIOPATHIC PERIPHERAL NEUROPATHY: ICD-10-CM

## 2021-12-10 RX ORDER — GABAPENTIN 300 MG/1
CAPSULE ORAL
Qty: 30 CAPSULE | Refills: 1 | Status: SHIPPED | OUTPATIENT
Start: 2021-12-10 | End: 2022-03-25

## 2021-12-10 NOTE — TELEPHONE ENCOUNTER
Gabapentin      Last Written Prescription Date:  8/20/2021  Last Fill Quantity: 30,   # refills: 1  Last Office Visit: 10/18/2021  Future Office visit:       Routing refill request to provider for review/approval because:  Drug not on the G, P or [facility] refill protocol or controlled substance

## 2022-02-04 DIAGNOSIS — G60.9 IDIOPATHIC PERIPHERAL NEUROPATHY: ICD-10-CM

## 2022-02-04 DIAGNOSIS — M79.2 NEUROPATHIC PAIN: ICD-10-CM

## 2022-02-04 NOTE — TELEPHONE ENCOUNTER
Receive request requesting refill(s) for    gabapentin (NEURONTIN) 100 MG capsule     Last refilled on 10/18/2021    Pharmacy [facility] - [place], [place] - [address]     Pt last seen on 08/30/2021  Next appt scheduled for NA    Will facilitate refill.    Laura Monae MA  [facility] [place]

## 2022-02-07 RX ORDER — GABAPENTIN 100 MG/1
100 CAPSULE ORAL 2 TIMES DAILY
Qty: 60 CAPSULE | Refills: 0 | Status: SHIPPED | OUTPATIENT
Start: 2022-02-07 | End: 2022-03-08

## 2022-02-14 ENCOUNTER — TELEPHONE (OUTPATIENT)
Dept: FAMILY MEDICINE | Facility: CLINIC | Age: 72
End: 2022-02-14
Payer: COMMERCIAL

## 2022-02-14 DIAGNOSIS — R73.9 ELEVATED BLOOD SUGAR: Primary | ICD-10-CM

## 2022-02-14 NOTE — TELEPHONE ENCOUNTER
Reason for Call:  Other call back    Detailed comments: Patient called stating he thinks he has diabetes but doesn't know anything about what to do with that. He has pain in his feet so he thinks he has neuropathy. He would like to talk to someone about it and what he should do.     Phone Number Patient can be reached at: Home number on file [phone] (home)    Best Time: Any    Can we leave a detailed message on this number? YES    Call taken on 2/14/2022 at 10:49 AM by Jaimie Rowe

## 2022-02-14 NOTE — TELEPHONE ENCOUNTER
Delfino Ferguson MD  [facility] 44 minutes ago (10:55 AM)     SJ    He needs to have a fasting glucose done and a hemoglobin A1c and then follow-up with an appointment to discuss his concerns    Message text

## 2022-03-07 DIAGNOSIS — G60.9 IDIOPATHIC PERIPHERAL NEUROPATHY: ICD-10-CM

## 2022-03-07 DIAGNOSIS — M79.2 NEUROPATHIC PAIN: ICD-10-CM

## 2022-03-07 NOTE — TELEPHONE ENCOUNTER
Gabapentin      Last Written Prescription Date:  2/7/2022  Last Fill Quantity: 60,   # refills: 0  Last Office Visit: 10/18/2021  Future Office visit:       Routing refill request to provider for review/approval because:  Drug not on the G, P or [facility] refill protocol or controlled substance

## 2022-03-08 RX ORDER — GABAPENTIN 100 MG/1
100 CAPSULE ORAL 2 TIMES DAILY
Qty: 60 CAPSULE | Refills: 0 | Status: SHIPPED | OUTPATIENT
Start: 2022-03-08 | End: 2022-04-11

## 2022-03-16 DIAGNOSIS — I10 BENIGN ESSENTIAL HYPERTENSION: ICD-10-CM

## 2022-03-17 RX ORDER — LISINOPRIL 10 MG/1
TABLET ORAL
Qty: 90 TABLET | Refills: 1 | Status: SHIPPED | OUTPATIENT
Start: 2022-03-17 | End: 2022-09-16

## 2022-03-25 DIAGNOSIS — G60.9 IDIOPATHIC PERIPHERAL NEUROPATHY: ICD-10-CM

## 2022-03-25 RX ORDER — GABAPENTIN 300 MG/1
CAPSULE ORAL
Qty: 30 CAPSULE | Refills: 1 | Status: SHIPPED | OUTPATIENT
Start: 2022-03-25 | End: 2022-07-20

## 2022-03-25 NOTE — TELEPHONE ENCOUNTER
Gabapentin      Last Written Prescription Date:  12/10/2021  Last Fill Quantity: 30,   # refills: 1  Last Office Visit: 10/18/2021  Future Office visit:       Routing refill request to provider for review/approval because:  Drug not on the G, P or [facility] refill protocol or controlled substance

## 2022-04-11 DIAGNOSIS — G60.9 IDIOPATHIC PERIPHERAL NEUROPATHY: ICD-10-CM

## 2022-04-11 DIAGNOSIS — M79.2 NEUROPATHIC PAIN: ICD-10-CM

## 2022-04-11 RX ORDER — GABAPENTIN 100 MG/1
CAPSULE ORAL
Qty: 60 CAPSULE | Refills: 0 | Status: SHIPPED | OUTPATIENT
Start: 2022-04-11 | End: 2022-05-17

## 2022-04-11 NOTE — TELEPHONE ENCOUNTER
Pending Prescriptions:                       Disp   Refills    gabapentin (NEURONTIN) 100 MG capsule [Pha*60 cap*0        Sig: TAKE ONE CAPSULE BY MOUTH TWICE A DAY    Routing refill request to provider for review/approval because:  Drug not on the [facility] refill protocol     Jerri Munroe RN

## 2022-05-17 DIAGNOSIS — G60.9 IDIOPATHIC PERIPHERAL NEUROPATHY: ICD-10-CM

## 2022-05-17 DIAGNOSIS — M79.2 NEUROPATHIC PAIN: ICD-10-CM

## 2022-05-17 RX ORDER — GABAPENTIN 100 MG/1
CAPSULE ORAL
Qty: 60 CAPSULE | Refills: 0 | Status: SHIPPED | OUTPATIENT
Start: 2022-05-17 | End: 2022-08-09

## 2022-05-17 NOTE — TELEPHONE ENCOUNTER
Pending Prescriptions:                       Disp   Refills    gabapentin (NEURONTIN) 100 MG capsule [Pha*60 cap*0        Sig: TAKE ONE CAPSULE BY MOUTH TWICE A DAY        Routing refill request to provider for review/approval because:  Drug not on the [facility] refill protocol     Chirag Dalton, SUMANN, RN, PHN  [facility] RN for [facility]  May 17, 2022

## 2022-07-19 DIAGNOSIS — G60.9 IDIOPATHIC PERIPHERAL NEUROPATHY: ICD-10-CM

## 2022-07-20 RX ORDER — GABAPENTIN 300 MG/1
CAPSULE ORAL
Qty: 30 CAPSULE | Refills: 1 | Status: SHIPPED | OUTPATIENT
Start: 2022-07-20 | End: 2022-12-12

## 2022-07-20 NOTE — TELEPHONE ENCOUNTER
Gabapentin      Last Written Prescription Date:  5/17/2022  Last Fill Quantity: 60,   # refills: 0  Last Office Visit: 10/18/2021  Future Office visit:       Routing refill request to provider for review/approval because:  Drug not on the [facility], [facility] or [facility] refill protocol or controlled substance    Raciel Mckeon RN

## 2022-08-06 DIAGNOSIS — G60.9 IDIOPATHIC PERIPHERAL NEUROPATHY: ICD-10-CM

## 2022-08-06 DIAGNOSIS — M79.2 NEUROPATHIC PAIN: ICD-10-CM

## 2022-08-09 RX ORDER — GABAPENTIN 100 MG/1
CAPSULE ORAL
Qty: 60 CAPSULE | Refills: 0 | Status: SHIPPED | OUTPATIENT
Start: 2022-08-09 | End: 2022-09-09

## 2022-09-09 DIAGNOSIS — M79.2 NEUROPATHIC PAIN: ICD-10-CM

## 2022-09-09 DIAGNOSIS — G60.9 IDIOPATHIC PERIPHERAL NEUROPATHY: ICD-10-CM

## 2022-09-09 NOTE — TELEPHONE ENCOUNTER
Gabapentin      Last Written Prescription Date:  8-9-2022  Last Fill Quantity: 60,   # refills: 0  Last Office Visit: 10-  Future Office visit:       Routing refill request to provider for review/approval because:  Drug not on the G, P or [facility] refill protocol or controlled substance

## 2022-09-10 RX ORDER — GABAPENTIN 100 MG/1
CAPSULE ORAL
Qty: 60 CAPSULE | Refills: 0 | Status: SHIPPED | OUTPATIENT
Start: 2022-09-10 | End: 2022-09-16

## 2022-09-15 DIAGNOSIS — M79.2 NEUROPATHIC PAIN: ICD-10-CM

## 2022-09-15 DIAGNOSIS — G60.9 IDIOPATHIC PERIPHERAL NEUROPATHY: ICD-10-CM

## 2022-09-15 DIAGNOSIS — I10 BENIGN ESSENTIAL HYPERTENSION: ICD-10-CM

## 2022-09-15 NOTE — TELEPHONE ENCOUNTER
Gabapentin      Last Written Prescription Date:  9-  Last Fill Quantity: 60,   # refills: 0  Last Office Visit: 10-  Future Office visit:       Routing refill request to provider for review/approval because:  Drug not on the G, P or [facility] refill protocol or controlled substance

## 2022-09-16 RX ORDER — GABAPENTIN 100 MG/1
CAPSULE ORAL
Qty: 60 CAPSULE | Refills: 0 | Status: SHIPPED | OUTPATIENT
Start: 2022-09-16 | End: 2022-10-12

## 2022-09-16 RX ORDER — LISINOPRIL 10 MG/1
TABLET ORAL
Qty: 90 TABLET | Refills: 1 | Status: SHIPPED | OUTPATIENT
Start: 2022-09-16 | End: 2023-03-21

## 2022-10-12 DIAGNOSIS — G60.9 IDIOPATHIC PERIPHERAL NEUROPATHY: ICD-10-CM

## 2022-10-12 DIAGNOSIS — M79.2 NEUROPATHIC PAIN: ICD-10-CM

## 2022-10-12 RX ORDER — GABAPENTIN 100 MG/1
CAPSULE ORAL
Qty: 60 CAPSULE | Refills: 0 | Status: SHIPPED | OUTPATIENT
Start: 2022-10-12 | End: 2022-11-08

## 2022-10-12 NOTE — TELEPHONE ENCOUNTER
Requested Prescriptions   Pending Prescriptions Disp Refills     gabapentin (NEURONTIN) 100 MG capsule [Pharmacy Med Name: GABAPENTIN 100MG CAPS] 60 capsule 0     Sig: TAKE ONE CAPSULE BY MOUTH TWICE A DAY       Routing refill request to provider for review/approval because:  Drug not on the G, P or [facility] refill protocol or controlled substance

## 2022-10-27 ENCOUNTER — OFFICE VISIT (OUTPATIENT)
Dept: FAMILY MEDICINE | Facility: CLINIC | Age: 72
End: 2022-10-27
Payer: COMMERCIAL

## 2022-10-27 VITALS
SYSTOLIC BLOOD PRESSURE: 122 MMHG | BODY MASS INDEX: 26.26 KG/M2 | TEMPERATURE: 96.8 F | HEART RATE: 64 BPM | RESPIRATION RATE: 20 BRPM | DIASTOLIC BLOOD PRESSURE: 70 MMHG | OXYGEN SATURATION: 99 % | HEIGHT: 70 IN | WEIGHT: 183.4 LBS

## 2022-10-27 DIAGNOSIS — Z12.5 SCREENING FOR PROSTATE CANCER: ICD-10-CM

## 2022-10-27 DIAGNOSIS — I10 BENIGN ESSENTIAL HYPERTENSION: ICD-10-CM

## 2022-10-27 DIAGNOSIS — Z00.00 ENCOUNTER FOR MEDICARE ANNUAL WELLNESS EXAM: Primary | ICD-10-CM

## 2022-10-27 DIAGNOSIS — Z13.6 SCREENING FOR CARDIOVASCULAR CONDITION: ICD-10-CM

## 2022-10-27 LAB
ALBUMIN SERPL-MCNC: 3.9 G/DL (ref 3.4–5)
ALBUMIN UR-MCNC: NEGATIVE MG/DL
ALP SERPL-CCNC: 86 U/L (ref 40–150)
ALT SERPL W P-5'-P-CCNC: 30 U/L (ref 0–70)
ANION GAP SERPL CALCULATED.3IONS-SCNC: 3 MMOL/L (ref 3–14)
APPEARANCE UR: CLEAR
AST SERPL W P-5'-P-CCNC: 16 U/L (ref 0–45)
BILIRUB SERPL-MCNC: 1.4 MG/DL (ref 0.2–1.3)
BILIRUB UR QL STRIP: NEGATIVE
BUN SERPL-MCNC: 18 MG/DL (ref 7–30)
CALCIUM SERPL-MCNC: 8.8 MG/DL (ref 8.5–10.1)
CHLORIDE BLD-SCNC: 104 MMOL/L (ref 94–109)
CHOLEST SERPL-MCNC: 145 MG/DL
CO2 SERPL-SCNC: 28 MMOL/L (ref 20–32)
COLOR UR AUTO: YELLOW
CREAT SERPL-MCNC: 1.11 MG/DL (ref 0.66–1.25)
FASTING STATUS PATIENT QL REPORTED: YES
GFR SERPL CREATININE-BSD FRML MDRD: 71 ML/MIN/1.73M2
GLUCOSE BLD-MCNC: 101 MG/DL (ref 70–99)
GLUCOSE UR STRIP-MCNC: NEGATIVE MG/DL
HDLC SERPL-MCNC: 41 MG/DL
HGB UR QL STRIP: ABNORMAL
KETONES UR STRIP-MCNC: NEGATIVE MG/DL
LDLC SERPL CALC-MCNC: 81 MG/DL
LEUKOCYTE ESTERASE UR QL STRIP: NEGATIVE
MUCOUS THREADS #/AREA URNS LPF: PRESENT /LPF
NITRATE UR QL: NEGATIVE
NONHDLC SERPL-MCNC: 104 MG/DL
PH UR STRIP: 5 [PH] (ref 5–7)
POTASSIUM BLD-SCNC: 4.5 MMOL/L (ref 3.4–5.3)
PROT SERPL-MCNC: 7.8 G/DL (ref 6.8–8.8)
PSA SERPL-MCNC: <0.01 UG/L (ref 0–4)
RBC URINE: <1 /HPF
SODIUM SERPL-SCNC: 135 MMOL/L (ref 133–144)
SP GR UR STRIP: 1.01 (ref 1–1.03)
TRIGL SERPL-MCNC: 117 MG/DL
UROBILINOGEN UR STRIP-MCNC: NORMAL MG/DL
WBC URINE: 0 /HPF

## 2022-10-27 PROCEDURE — 80061 LIPID PANEL: CPT | Performed by: FAMILY MEDICINE

## 2022-10-27 PROCEDURE — G0439 PPPS, SUBSEQ VISIT: HCPCS | Performed by: FAMILY MEDICINE

## 2022-10-27 PROCEDURE — 81001 URINALYSIS AUTO W/SCOPE: CPT | Performed by: FAMILY MEDICINE

## 2022-10-27 PROCEDURE — G0103 PSA SCREENING: HCPCS | Performed by: FAMILY MEDICINE

## 2022-10-27 PROCEDURE — 36415 COLL VENOUS BLD VENIPUNCTURE: CPT | Performed by: FAMILY MEDICINE

## 2022-10-27 PROCEDURE — 80053 COMPREHEN METABOLIC PANEL: CPT | Performed by: FAMILY MEDICINE

## 2022-10-27 ASSESSMENT — ENCOUNTER SYMPTOMS
FEVER: 0
PARESTHESIAS: 0
DIZZINESS: 0
ARTHRALGIAS: 0
NERVOUS/ANXIOUS: 0
HEMATOCHEZIA: 0
DIARRHEA: 0
DYSURIA: 0
EYE PAIN: 0
NAUSEA: 0
FREQUENCY: 0
COUGH: 0
PALPITATIONS: 0
CONSTIPATION: 0
SORE THROAT: 0
MYALGIAS: 0
CHILLS: 0
SHORTNESS OF BREATH: 0
JOINT SWELLING: 0
HEARTBURN: 0
HEMATURIA: 0
HEADACHES: 0
ABDOMINAL PAIN: 0

## 2022-10-27 ASSESSMENT — PAIN SCALES - GENERAL: PAINLEVEL: NO PAIN (0)

## 2022-10-27 ASSESSMENT — ACTIVITIES OF DAILY LIVING (ADL): CURRENT_FUNCTION: NO ASSISTANCE NEEDED

## 2022-10-27 NOTE — PROGRESS NOTES
"SUBJECTIVE:   Aung is a 72 year old who presents for Preventive Visit.      Patient has been advised of split billing requirements and indicates understanding: Yes  Are you in the first 12 months of your Medicare coverage?  No    Healthy Habits:     In general, how would you rate your overall health?  Good    Frequency of exercise:  None    Do you usually eat at least 4 servings of fruit and vegetables a day, include whole grains    & fiber and avoid regularly eating high fat or \"junk\" foods?  No    Taking medications regularly:  Yes    Medication side effects:  None    Ability to successfully perform activities of daily living:  No assistance needed    Home Safety:  No safety concerns identified    Hearing Impairment:  Difficulty following a conversation in a noisy restaurant or crowded room and difficulty understanding soft or whispered speech    In the past 6 months, have you been bothered by leaking of urine?  No    In general, how would you rate your overall mental or emotional health?  Good      PHQ-2 Total Score: 0    Additional concerns today:  Yes    Do you feel safe in your environment? Yes    Have you ever done Advance Care Planning? (For example, a Health Directive, POLST, or a discussion with a medical provider or your loved ones about your wishes): No, advance care planning information given to patient to review.  Patient plans to discuss their wishes with loved ones or provider.         Fall risk  Fallen 2 or more times in the past year?: No  Any fall with injury in the past year?: No    Cognitive Screening   1) Repeat 3 items (Leader, Season, Table)    2) Clock draw: NORMAL  3) 3 item recall: Recalls 3 objects  Results: 3 items recalled: COGNITIVE IMPAIRMENT LESS LIKELY    Mini-CogTM Copyright DAVIDSON Tinoco. Licensed by the author for use in [facility]; reprinted with permission (taj@.[email or web address]). All rights reserved.      Do you have sleep apnea, excessive snoring or daytime drowsiness?: " yes    Reviewed and updated as needed this visit by clinical staff   Tobacco  Allergies  Meds   Med Hx  Surg Hx  Fam Hx  Soc Hx        Reviewed and updated as needed this visit by Provider                 Social History     Tobacco Use     Smoking status: Former     Smokeless tobacco: Never     Tobacco comments:     quit in 1986   Substance Use Topics     Alcohol use: Yes     Comment: beers     If you drink alcohol do you typically have >3 drinks per day or >7 drinks per week? No    Alcohol Use 10/27/2022   Prescreen: >3 drinks/day or >7 drinks/week? No   Prescreen: >3 drinks/day or >7 drinks/week? -               Current providers sharing in care for this patient include:   Patient Care Team:  Delfino Ferguson MD as PCP - General  Delfino Ferguson MD as Assigned PCP    The following health maintenance items are reviewed in Epic and correct as of today:  Health Maintenance   Topic Date Due     ANNUAL REVIEW OF  ORDERS  Never done     HEPATITIS B IMMUNIZATION (1 of 3 - 3-dose series) Never done     LUNG CANCER SCREENING  Never done     AORTIC ANEURYSM SCREENING (SYSTEM ASSIGNED)  Never done     Pneumococcal Vaccine: 65+ Years (2 - PPSV23 if available, else PCV20) 03/30/2018     DTAP/TDAP/TD IMMUNIZATION (2 - Td or Tdap) 04/20/2019     COVID-19 Vaccine (4 - Booster for Pfizer series) 12/09/2021     INFLUENZA VACCINE (1) 09/01/2022     MEDICARE ANNUAL WELLNESS VISIT  10/18/2022     FALL RISK ASSESSMENT  10/27/2023     LIPID  10/18/2026     ADVANCE CARE PLANNING  10/18/2026     COLORECTAL CANCER SCREENING  08/27/2028     HEPATITIS C SCREENING  Completed     PHQ-2 (once per calendar year)  Completed     ZOSTER IMMUNIZATION  Completed     IPV IMMUNIZATION  Aged Out     MENINGITIS IMMUNIZATION  Aged Out     Labs reviewed in EPIC          Review of Systems   Constitutional: Negative for chills and fever.   HENT: Negative for congestion, ear pain, hearing loss and sore throat.    Eyes: Negative for pain and  "visual disturbance.   Respiratory: Negative for cough and shortness of breath.    Cardiovascular: Negative for chest pain, palpitations and peripheral edema.   Gastrointestinal: Negative for abdominal pain, constipation, diarrhea, heartburn, hematochezia and nausea.   Genitourinary: Negative for dysuria, frequency, genital sores, hematuria, impotence, penile discharge and urgency.   Musculoskeletal: Negative for arthralgias, joint swelling and myalgias.   Skin: Negative for rash.   Neurological: Negative for dizziness, headaches and paresthesias.   Psychiatric/Behavioral: Negative for mood changes. The patient is not nervous/anxious.          OBJECTIVE:   /70 (BP Location: Right arm, Patient Position: Chair, Cuff Size: Adult Large)   Pulse 64   Temp 96.8  F (36  C) (Temporal)   Resp 20   Ht 1.778 m (5' 10\")   Wt 83.2 kg (183 lb 6.4 oz)   SpO2 99%   BMI 26.32 kg/m   Estimated body mass index is 26.32 kg/m  as calculated from the following:    Height as of this encounter: 1.778 m (5' 10\").    Weight as of this encounter: 83.2 kg (183 lb 6.4 oz).  Physical Exam  GENERAL: healthy, alert and no distress  EYES: Eyes grossly normal to inspection, PERRL and conjunctivae and sclerae normal  HENT: ear canals and TM's normal, nose and mouth without ulcers or lesions  NECK: no adenopathy, no asymmetry, masses, or scars and thyroid normal to palpation  RESP: lungs clear to auscultation - no rales, rhonchi or wheezes  CV: regular rate and rhythm, normal S1 S2, no S3 or S4, no murmur, click or rub, no peripheral edema and peripheral pulses strong  ABDOMEN: soft, nontender, no hepatosplenomegaly, no masses and bowel sounds normal  MS: no gross musculoskeletal defects noted, no edema  SKIN: no suspicious lesions or rashes  NEURO: Normal strength and tone, mentation intact and speech normal  PSYCH: mentation appears normal, affect normal/bright    Diagnostic Test Results:  Labs reviewed in Epic  Results for orders placed " or performed in visit on 10/27/22 (from the past 24 hour(s))   UA Macro with Reflex to Micro and Culture - lab collect    Specimen: Urine, NOS   Result Value Ref Range    Color Urine Yellow Colorless, Straw, Light Yellow, Yellow    Appearance Urine Clear Clear    Glucose Urine Negative Negative mg/dL    Bilirubin Urine Negative Negative    Ketones Urine Negative Negative mg/dL    Specific Gravity Urine 1.013 1.003 - 1.035    Blood Urine Small (A) Negative    pH Urine 5.0 5.0 - 7.0    Protein Albumin Urine Negative Negative mg/dL    Urobilinogen Urine Normal Normal, 2.0 mg/dL    Nitrite Urine Negative Negative    Leukocyte Esterase Urine Negative Negative    Mucus Urine Present (A) None Seen /LPF    RBC Urine <1 <=2 /HPF    WBC Urine 0 <=5 /HPF    Narrative    Urine Culture not indicated   Comprehensive metabolic panel (BMP + Alb, Alk Phos, ALT, AST, Total. Bili, TP)   Result Value Ref Range    Sodium 135 133 - 144 mmol/L    Potassium 4.5 3.4 - 5.3 mmol/L    Chloride 104 94 - 109 mmol/L    Carbon Dioxide (CO2) 28 20 - 32 mmol/L    Anion Gap 3 3 - 14 mmol/L    Urea Nitrogen 18 7 - 30 mg/dL    Creatinine 1.11 0.66 - 1.25 mg/dL    Calcium 8.8 8.5 - 10.1 mg/dL    Glucose 101 (H) 70 - 99 mg/dL    Alkaline Phosphatase 86 40 - 150 U/L    AST 16 0 - 45 U/L    ALT 30 0 - 70 U/L    Protein Total 7.8 6.8 - 8.8 g/dL    Albumin 3.9 3.4 - 5.0 g/dL    Bilirubin Total 1.4 (H) 0.2 - 1.3 mg/dL    GFR Estimate 71 >60 mL/min/1.73m2   Lipid panel reflex to direct LDL Fasting   Result Value Ref Range    Cholesterol 145 <200 mg/dL    Triglycerides 117 <150 mg/dL    Direct Measure HDL 41 >=40 mg/dL    LDL Cholesterol Calculated 81 <=100 mg/dL    Non HDL Cholesterol 104 <130 mg/dL    Patient Fasting > 8hrs? Yes     Narrative    Cholesterol  Desirable:  <200 mg/dL    Triglycerides  Normal:  Less than 150 mg/dL  Borderline High:  150-199 mg/dL  High:  200-499 mg/dL  Very High:  Greater than or equal to 500 mg/dL    Direct Measure HDL  Female:   "Greater than or equal to 50 mg/dL   Male:  Greater than or equal to 40 mg/dL    LDL Cholesterol  Desirable:  <100mg/dL  Above Desirable:  100-129 mg/dL   Borderline High:  130-159 mg/dL   High:  160-189 mg/dL   Very High:  >= 190 mg/dL    Non HDL Cholesterol  Desirable:  130 mg/dL  Above Desirable:  130-159 mg/dL  Borderline High:  160-189 mg/dL  High:  190-219 mg/dL  Very High:  Greater than or equal to 220 mg/dL   PSA, screen   Result Value Ref Range    Prostate Specific Antigen Screen <0.01 0.00 - 4.00 ug/L    Narrative    Assay Method:  Chemiluminescence using Siemens   Vista analyzer.       ASSESSMENT / PLAN:   (Z00.00) Encounter for Medicare annual wellness exam  (primary encounter diagnosis)  Comment: Generally healthy [place].  Plan:     (I10) Benign essential hypertension  Comment: Blood pressure is good continue him on his medication.  Plan: Comprehensive metabolic panel (BMP + Alb, Alk         Phos, ALT, AST, Total. Bili, TP), UA Macro with        Reflex to Micro and Culture - lab collect            (Z12.5) Screening for prostate cancer  Comment:   Plan: PSA, screen            (Z13.6) Screening for cardiovascular condition  Comment: Notify with results.  Plan: Lipid panel reflex to direct LDL Fasting                    COUNSELING:  Reviewed preventive health counseling, as reflected in patient instructions       Regular exercise       Healthy diet/nutrition       Vision screening    Estimated body mass index is 26.32 kg/m  as calculated from the following:    Height as of this encounter: 1.778 m (5' 10\").    Weight as of this encounter: 83.2 kg (183 lb 6.4 oz).        He reports that he has quit smoking. He has never used smokeless tobacco.      Appropriate preventive services were discussed with this patient, including applicable screening as appropriate for cardiovascular disease, diabetes, osteopenia/osteoporosis, and glaucoma.  As appropriate for age/gender, " discussed screening for colorectal cancer, prostate cancer, breast cancer, and cervical cancer. Checklist reviewing preventive services available has been given to the patient.    Reviewed patients plan of care and provided an AVS. The Basic Care Plan (routine screening as documented in Health Maintenance) for Aung meets the Care Plan requirement. This Care Plan has been established and reviewed with the Patient.    Counseling Resources:  ATP IV Guidelines  Pooled Cohorts Equation Calculator  Breast Cancer Risk Calculator  Breast Cancer: Medication to Reduce Risk  FRAX Risk Assessment  ICSI Preventive Guidelines  Dietary Guidelines for Americans, 2010  USDA's MyPlate  ASA Prophylaxis  Lung CA Screening    Delfino Ferguson MD  [facility]    Identified Health Risks:

## 2022-10-27 NOTE — PATIENT INSTRUCTIONS
Patient Education   Personalized Prevention Plan  You are due for the preventive services outlined below.  Your care team is available to assist you in scheduling these services.  If you have already completed any of these items, please share that information with your care team to update in your medical record.  Health Maintenance Due   Topic Date Due     Hepatitis B Vaccine (1 of 3 - 3-dose series) Never done     LUNG CANCER SCREENING  Never done     AORTIC ANEURYSM SCREENING (SYSTEM ASSIGNED)  Never done     Pneumococcal Vaccine (2 - PPSV23 if available, else PCV20) 03/30/2018     Diptheria Tetanus Pertussis (DTAP/TDAP/TD) Vaccine (2 - Td or Tdap) 04/20/2019     COVID-19 Vaccine (4 - Booster for Pfizer series) 12/09/2021     Flu Vaccine (1) 09/01/2022       Exercise for a Healthier Heart  You may wonder how you can improve the health of your heart. If you re thinking about exercise, you re on the right track. You don t need to become an athlete. But you do need a certain amount of brisk exercise to help strengthen your heart. If you have been diagnosed with a heart condition, your healthcare provider may advise exercise to help stabilize your condition. To help make exercise a habit, choose safe, fun activities.      Exercise with a friend. When activity is fun, you're more likely to stick with it.   Before you start  Check with your healthcare provider before starting an exercise program. This is especially important if you have not been active for a while. It's also important if you have a long-term (chronic) health problem such as heart disease, diabetes, or obesity. Or if you are at high risk for having these problems.   Why exercise?  Exercising regularly offers many healthy rewards. It can help you do all of the following:     Improve your blood cholesterol level to help prevent further heart trouble    Lower your blood pressure to help prevent a stroke or heart attack    Control diabetes, or reduce your  risk of getting this disease    Improve your heart and lung function    Reach and stay at a healthy weight    Make your muscles stronger so you can stay active    Prevent falls and fractures by slowing the loss of bone mass (osteoporosis)    Manage stress better    Reduce your blood pressure    Improve your sense of self and your body image  Exercise tips      Ease into your routine. Set small goals. Then build on them. If you are not sure what your activity level should be, talk with your healthcare provider first before starting an exercise routine.    Exercise on most days. Aim for a total of 150 minutes (2 hours and 30 minutes) or more of moderate-intensity aerobic activity each week. Or 75 minutes (1 hour and 15 minutes) or more of vigorous-intensity aerobic activity each week. Or try for a combination of both. Moderate activity means that you breathe heavier and your heart rate increases but you can still talk. Think about doing 40 minutes of moderate exercise, 3 to 4 times a week. For best results, activity should last for about 40 minutes to lower blood pressure and cholesterol. It's OK to work up to the 40-minute period over time. Examples of moderate-intensity activity are walking 1 mile in 15 minutes. Or doing 30 to 45 minutes of yard work.    Step up your daily activity level.  Along with your exercise program, try being more active the whole day. Walk instead of drive. Or park further away so that you take more steps each day. Do more household tasks or yard work. You may not be able to meet the advised mount of physical activity. But doing some moderate- or vigorous-intensity aerobic activity can help reduce your risk for heart disease. Your healthcare provider can help you figure out what is best for you.    Choose 1 or more activities you enjoy.  Walking is one of the easiest things you can do. You can also try swimming, riding a bike, dancing, or taking an exercise class.    When to call your  healthcare provider  Call your healthcare provider if you have any of these:     Chest pain or feel dizzy or lightheaded    Burning, tightness, pressure, or heaviness in your chest, neck, shoulders, back, or arms    Abnormal shortness of breath    More joint or muscle pain    A very fast or irregular heartbeat (palpitations)  [employer] last reviewed this educational content on 7/1/2019 2000-2021 [employer]. All rights reserved. This information is not intended as a substitute for professional medical care. Always follow your healthcare professional's instructions.          Understanding USDA MyPlate  The USDA has guidelines to help you make healthy food choices. These are called MyPlate. MyPlate shows the food groups that make up healthy meals using the image of a place setting. Before you eat, think about the healthiest choices for what to put on your plate or in your cup or bowl. To learn more about building a healthy plate, visit www.choosemyplate.gov.    The food groups    Fruits. Any fruit or 100% fruit juice counts as part of the Fruit Group. Fruits may be fresh, canned, frozen, or dried, and may be whole, cut-up, or pureed. Make 1/2 of your plate fruits and vegetables.    Vegetables. Any vegetable or 100% vegetable juice counts as a member of the Vegetable Group. Vegetables may be fresh, frozen, canned, or dried. They can be served raw or cooked and may be whole, cut-up, or mashed. Make 1/2 of your plate fruits and vegetables.    Grains. All foods made from grains are part of the Grains Group. These include wheat, rice, oats, cornmeal, and barley. Grains are often used to make foods such as bread, pasta, oatmeal, cereal, tortillas, and grits. Grains should be no more than 1/4 of your plate. At least half of your grains should be whole grains.    Protein. This group includes meat, poultry, seafood, beans and peas, eggs, processed soy products (such as tofu), nuts (including nut butters), and  seeds. Make protein choices no more than 1/4 of your plate. Meat and poultry choices should be lean or low fat.    Dairy. The Dairy Group includes all fluid milk products and foods made from milk that contain calcium, such as yogurt and cheese. (Foods that have little calcium, such as cream, butter, and cream cheese, are not part of this group.) Most dairy choices should be low-fat or fat-free.    Oils. Oils aren't a food group, but they do contain essential nutrients. However it's important to watch your intake of oils. These are fats that are liquid at room temperature. They include canola, corn, olive, soybean, vegetable, and sunflower oil. Foods that are mainly oil include mayonnaise, certain salad dressings, and soft margarines. You likely already get your daily oil allowance from the foods you eat.  Things to limit  Eating healthy also means limiting these things in your diet:       Salt (sodium). Many processed foods have a lot of sodium. To keep sodium intake down, eat fresh vegetables, meats, poultry, and seafood when possible. Purchase low-sodium, reduced-sodium, or no-salt-added food products at the store. And don't add salt to your meals at home. Instead, season them with herbs and spices such as dill, oregano, cumin, and paprika. Or try adding flavor with lemon or lime zest and juice.    Saturated fat. Saturated fats are most often found in animal products such as beef, pork, and chicken. They are often solid at room temperature, such as butter. To reduce your saturated fat intake, choose leaner cuts of meat and poultry. And try healthier cooking methods such as grilling, broiling, roasting, or baking. For a simple lower-fat swap, use plain nonfat yogurt instead of mayonnaise when making potato salad or macaroni salad.    Added sugars. These are sugars added to foods. They are in foods such as ice cream, candy, soda, fruit drinks, sports drinks, energy drinks, cookies, pastries, jams, and syrups. Cut  down on added sugars by sharing sweet treats with a family member or friend. You can also choose fruit for dessert, and drink water or other unsweetened beverages.     [employer] last reviewed this educational content on 6/1/2020 2000-2021 [employer]. All rights reserved. This information is not intended as a substitute for professional medical care. Always follow your healthcare professional's instructions.          Signs of Hearing Loss      Hearing much better with one ear can be a sign of hearing loss.   Hearing loss is a problem shared by many people. In fact, it is one of the most common health problems, particularly as people age. Most people age 65 and older have some hearing loss. By age 80, almost everyone does. Hearing loss often occurs slowly over the years. So you may not realize your hearing has gotten worse.  Have your hearing checked  Call your healthcare provider if you:    Have to strain to hear normal conversation    Have to watch other people s faces very carefully to follow what they re saying    Need to ask people to repeat what they ve said    Often misunderstand what people are saying    Turn the volume of the television or radio up so high that others complain    Feel that people are mumbling when they re talking to you    Find that the effort to hear leaves you feeling tired and irritated    Notice, when using the phone, that you hear better with one ear than the other  [employer] last reviewed this educational content on 1/1/2020 2000-2021 [employer]. All rights reserved. This information is not intended as a substitute for professional medical care. Always follow your healthcare professional's instructions.

## 2022-11-08 DIAGNOSIS — G60.9 IDIOPATHIC PERIPHERAL NEUROPATHY: ICD-10-CM

## 2022-11-08 DIAGNOSIS — M79.2 NEUROPATHIC PAIN: ICD-10-CM

## 2022-11-08 RX ORDER — GABAPENTIN 100 MG/1
CAPSULE ORAL
Qty: 60 CAPSULE | Refills: 0 | Status: SHIPPED | OUTPATIENT
Start: 2022-11-08 | End: 2022-12-12

## 2022-11-08 NOTE — TELEPHONE ENCOUNTER
Routing refill request to provider for review/approval because:    Requested Prescriptions   Pending Prescriptions Disp Refills    gabapentin (NEURONTIN) 100 MG capsule [Pharmacy Med Name: GABAPENTIN 100MG CAPS] 60 capsule 0     Sig: TAKE ONE CAPSULE BY MOUTH TWICE A DAY       There is no refill protocol information for this order

## 2022-12-08 DIAGNOSIS — G60.9 IDIOPATHIC PERIPHERAL NEUROPATHY: ICD-10-CM

## 2022-12-08 DIAGNOSIS — M79.2 NEUROPATHIC PAIN: ICD-10-CM

## 2022-12-08 NOTE — TELEPHONE ENCOUNTER
Gabapentin      Last Written Prescription Date:  11-8-2022  Last Fill Quantity: 60,   # refills: 0  Last Office Visit: 10-  Future Office visit:       Routing refill request to provider for review/approval because:  Drug not on the [facility] or [facility] refill protocol or controlled substance

## 2022-12-09 DIAGNOSIS — G60.9 IDIOPATHIC PERIPHERAL NEUROPATHY: ICD-10-CM

## 2022-12-12 RX ORDER — GABAPENTIN 300 MG/1
CAPSULE ORAL
Qty: 30 CAPSULE | Refills: 1 | Status: SHIPPED | OUTPATIENT
Start: 2022-12-12 | End: 2023-02-10

## 2022-12-12 RX ORDER — GABAPENTIN 100 MG/1
100 CAPSULE ORAL 2 TIMES DAILY
Qty: 60 CAPSULE | Refills: 0 | Status: SHIPPED | OUTPATIENT
Start: 2022-12-12 | End: 2023-01-10

## 2022-12-12 NOTE — TELEPHONE ENCOUNTER
Pending Prescriptions:                       Disp   Refills    gabapentin (NEURONTIN) 300 MG capsule [Pha*30 cap*1        Sig: TAKE ONE CAPSULE BY MOUTH AT BEDTIME    Routing refill request to provider for review/approval because:  Drug not on the [facility] refill protocol   Requested Prescriptions   Pending Prescriptions Disp Refills    gabapentin (NEURONTIN) 300 MG capsule [Pharmacy Med Name: GABAPENTIN 300MG CAPS] 30 capsule 1     Sig: TAKE ONE CAPSULE BY MOUTH AT BEDTIME       There is no refill protocol information for this order                 [employer] [facility].

## 2023-01-08 DIAGNOSIS — G60.9 IDIOPATHIC PERIPHERAL NEUROPATHY: ICD-10-CM

## 2023-01-08 DIAGNOSIS — M79.2 NEUROPATHIC PAIN: ICD-10-CM

## 2023-01-10 RX ORDER — GABAPENTIN 100 MG/1
CAPSULE ORAL
Qty: 60 CAPSULE | Refills: 0 | Status: SHIPPED | OUTPATIENT
Start: 2023-01-10 | End: 2023-02-07

## 2023-01-10 NOTE — TELEPHONE ENCOUNTER
Routing refill request to provider for review/approval because:    Requested Prescriptions   Pending Prescriptions Disp Refills    gabapentin (NEURONTIN) 100 MG capsule [Pharmacy Med Name: GABAPENTIN 100MG CAPS] 60 capsule 0     Sig: TAKE ONE CAPSULE BY MOUTH TWO TIMES A DAY       There is no refill protocol information for this order

## 2023-01-11 NOTE — TELEPHONE ENCOUNTER
Spoke with patient and informed of note below. Patient understood and appointment made.     Closing encounter.   Sherry Zayas MA

## 2023-02-07 DIAGNOSIS — M79.2 NEUROPATHIC PAIN: ICD-10-CM

## 2023-02-07 DIAGNOSIS — G60.9 IDIOPATHIC PERIPHERAL NEUROPATHY: ICD-10-CM

## 2023-02-07 RX ORDER — GABAPENTIN 100 MG/1
CAPSULE ORAL
Qty: 60 CAPSULE | Refills: 0 | Status: SHIPPED | OUTPATIENT
Start: 2023-02-07 | End: 2023-02-10

## 2023-02-10 ENCOUNTER — OFFICE VISIT (OUTPATIENT)
Dept: FAMILY MEDICINE | Facility: CLINIC | Age: 73
End: 2023-02-10
Payer: COMMERCIAL

## 2023-02-10 ENCOUNTER — TELEPHONE (OUTPATIENT)
Dept: FAMILY MEDICINE | Facility: CLINIC | Age: 73
End: 2023-02-10

## 2023-02-10 VITALS
HEART RATE: 65 BPM | SYSTOLIC BLOOD PRESSURE: 124 MMHG | TEMPERATURE: 98.7 F | BODY MASS INDEX: 26.09 KG/M2 | OXYGEN SATURATION: 99 % | WEIGHT: 186.38 LBS | HEIGHT: 71 IN | DIASTOLIC BLOOD PRESSURE: 80 MMHG

## 2023-02-10 DIAGNOSIS — R73.9 ELEVATED BLOOD SUGAR: ICD-10-CM

## 2023-02-10 DIAGNOSIS — Z76.89 ENCOUNTER TO ESTABLISH CARE: ICD-10-CM

## 2023-02-10 DIAGNOSIS — C61 MALIGNANT NEOPLASM OF PROSTATE (H): ICD-10-CM

## 2023-02-10 DIAGNOSIS — G62.9 PERIPHERAL POLYNEUROPATHY: Primary | ICD-10-CM

## 2023-02-10 DIAGNOSIS — I10 BENIGN ESSENTIAL HYPERTENSION: ICD-10-CM

## 2023-02-10 DIAGNOSIS — G61.82 MULTIFOCAL MOTOR NEUROPATHY (H): ICD-10-CM

## 2023-02-10 LAB
FASTING STATUS PATIENT QL REPORTED: YES
GLUCOSE SERPL-MCNC: 102 MG/DL (ref 70–99)
HBA1C MFR BLD: 5.8 %
TSH SERPL DL<=0.005 MIU/L-ACNC: 1.36 UIU/ML (ref 0.3–4.2)
VIT B12 SERPL-MCNC: 372 PG/ML (ref 232–1245)

## 2023-02-10 PROCEDURE — 82607 VITAMIN B-12: CPT | Performed by: STUDENT IN AN ORGANIZED HEALTH CARE EDUCATION/TRAINING PROGRAM

## 2023-02-10 PROCEDURE — 82947 ASSAY GLUCOSE BLOOD QUANT: CPT | Performed by: STUDENT IN AN ORGANIZED HEALTH CARE EDUCATION/TRAINING PROGRAM

## 2023-02-10 PROCEDURE — 36415 COLL VENOUS BLD VENIPUNCTURE: CPT | Performed by: STUDENT IN AN ORGANIZED HEALTH CARE EDUCATION/TRAINING PROGRAM

## 2023-02-10 PROCEDURE — 99214 OFFICE O/P EST MOD 30 MIN: CPT | Performed by: STUDENT IN AN ORGANIZED HEALTH CARE EDUCATION/TRAINING PROGRAM

## 2023-02-10 PROCEDURE — 83036 HEMOGLOBIN GLYCOSYLATED A1C: CPT | Performed by: STUDENT IN AN ORGANIZED HEALTH CARE EDUCATION/TRAINING PROGRAM

## 2023-02-10 PROCEDURE — 84443 ASSAY THYROID STIM HORMONE: CPT | Performed by: STUDENT IN AN ORGANIZED HEALTH CARE EDUCATION/TRAINING PROGRAM

## 2023-02-10 RX ORDER — PREGABALIN 50 MG/1
50 CAPSULE ORAL 2 TIMES DAILY
Qty: 90 CAPSULE | Refills: 1 | Status: SHIPPED | OUTPATIENT
Start: 2023-02-10 | End: 2023-05-05

## 2023-02-10 NOTE — TELEPHONE ENCOUNTER
Gabepentin 300mg caps      Last Written Prescription Date:  07/20/2022  Last Fill Quantity: 30,   # refills: 1  Last Office Visit: 02/10/2023  Future Office visit:       Routing refill request to provider for review/approval because:  Drug not active on patient's medication list

## 2023-02-10 NOTE — PROGRESS NOTES
Assessment & Plan     Encounter to establish care  History reviewed and updated    Peripheral polyneuropathy  Patient with longstanding history of peripheral neuropathy.  Labs in the past but unable to see specific work-up.  No previous EMGs.  We will add on lab evaluation today.  Minimal improvement with gabapentin but has been on a fairly low-dose.  We discussed increased versus trial of Lyrica.  We will start Lyrica at 50 mg twice daily and see how this goes.  Follow-up in 2 months.  - Vitamin B12  - TSH with free T4 reflex  - pregabalin (LYRICA) 50 MG capsule  Dispense: 90 capsule; Refill: 1  - Vitamin B12  - TSH with free T4 reflex    Benign essential hypertension  Stable    Malignant neoplasm of prostate (H)  Final PSAs    Elevated blood sugar  - Glucose  - Hemoglobin A1c (aka HBA1C)      Eric Miller MD  [facility]    Magali Horan is a 72 year old, presenting for the following health issues:  Establish Care      History of Present Illness       Reason for visit:  New Doctor    He eats 2-3 servings of fruits and vegetables daily.He consumes 0 sweetened beverage(s) daily.He exercises with enough effort to increase his heart rate 10 to 19 minutes per day.  He exercises with enough effort to increase his heart rate 3 or less days per week.   He is taking medications regularly.     Patient here today following up to establish care.  Has been on gabapentin for several years related to his neuropathy symptoms.  Notes symptoms to be bothersome all day but worse at night.  Does not find gabapentin particularly helpful with discomfort but does seem like he sleeps better with it.  No other specific injury that he notes.  Blood pressures been stable and medications reviewed.  No significant back pain or radicular symptoms.    Review of Systems   Constitutional, HEENT, cardiovascular, pulmonary, gi and gu systems are negative, except as otherwise noted.      Objective    /80   " Pulse 65   Temp 98.7  F (37.1  C) (Temporal)   Ht 1.791 m (5' 10.5\")   Wt 84.5 kg (186 lb 6 oz)   SpO2 99%   BMI 26.36 kg/m    Body mass index is 26.36 kg/m .  Physical Exam   GENERAL: healthy, alert and no distress  EYES: Eyes grossly normal to inspection, PERRL and conjunctivae and sclerae normal  RESP: lungs clear to auscultation - no rales, rhonchi or wheezes  CV: regular rate and rhythm, normal S1 S2, no S3 or S4, no murmur, click or rub, no peripheral edema and peripheral pulses strong  ABDOMEN: soft, nontender, no hepatosplenomegaly, no masses and bowel sounds normal  MS: no gross musculoskeletal defects noted, no edema, decreased monofilament great toe.  Normal DP pulses with good capillary refill.  No pain to palpation.  Negative Evin's  SKIN: no suspicious lesions or rashes  NEURO: Normal strength and tone, mentation intact and speech normal  PSYCH: mentation appears normal, affect normal/bright          "

## 2023-02-14 ENCOUNTER — TELEPHONE (OUTPATIENT)
Dept: FAMILY MEDICINE | Facility: CLINIC | Age: 73
End: 2023-02-14
Payer: COMMERCIAL

## 2023-02-14 NOTE — TELEPHONE ENCOUNTER
----- Message from Eric Miller MD sent at 2/14/2023  1:02 PM CST -----  Team - please call patient with results.  Normal results.  Further labs that will need to be drawn upon follow-up.

## 2023-02-17 ENCOUNTER — LAB (OUTPATIENT)
Dept: LAB | Facility: CLINIC | Age: 73
End: 2023-02-17
Payer: COMMERCIAL

## 2023-02-17 DIAGNOSIS — G62.9 PERIPHERAL POLYNEUROPATHY: ICD-10-CM

## 2023-02-17 LAB
ERYTHROCYTE [SEDIMENTATION RATE] IN BLOOD BY WESTERGREN METHOD: 4 MM/HR (ref 0–20)
TOTAL PROTEIN SERUM FOR ELP: 7.3 G/DL (ref 6.4–8.3)

## 2023-02-17 PROCEDURE — 84165 PROTEIN E-PHORESIS SERUM: CPT

## 2023-02-17 PROCEDURE — 84155 ASSAY OF PROTEIN SERUM: CPT

## 2023-02-17 PROCEDURE — 86038 ANTINUCLEAR ANTIBODIES: CPT

## 2023-02-17 PROCEDURE — 85652 RBC SED RATE AUTOMATED: CPT

## 2023-02-17 PROCEDURE — 36415 COLL VENOUS BLD VENIPUNCTURE: CPT

## 2023-02-20 LAB
ALBUMIN SERPL ELPH-MCNC: 4.4 G/DL (ref 3.7–5.1)
ALPHA1 GLOB SERPL ELPH-MCNC: 0.3 G/DL (ref 0.2–0.4)
ALPHA2 GLOB SERPL ELPH-MCNC: 0.6 G/DL (ref 0.5–0.9)
ANA SER QL IF: NEGATIVE
B-GLOBULIN SERPL ELPH-MCNC: 0.8 G/DL (ref 0.6–1)
GAMMA GLOB SERPL ELPH-MCNC: 1.2 G/DL (ref 0.7–1.6)
M PROTEIN SERPL ELPH-MCNC: 0 G/DL
PROT PATTERN SERPL ELPH-IMP: NORMAL

## 2023-03-21 DIAGNOSIS — I10 BENIGN ESSENTIAL HYPERTENSION: ICD-10-CM

## 2023-03-22 RX ORDER — LISINOPRIL 10 MG/1
10 TABLET ORAL DAILY
Qty: 90 TABLET | Refills: 3 | Status: SHIPPED | OUTPATIENT
Start: 2023-03-22 | End: 2024-03-01

## 2023-03-22 NOTE — TELEPHONE ENCOUNTER
Prescription approved per [facility] Refill Protocol.  Elizabet Schaefer RN on 3/22/2023 at 11:24 AM

## 2023-05-03 DIAGNOSIS — G62.9 PERIPHERAL POLYNEUROPATHY: ICD-10-CM

## 2023-05-05 RX ORDER — PREGABALIN 50 MG/1
CAPSULE ORAL
Qty: 90 CAPSULE | Refills: 1 | Status: SHIPPED | OUTPATIENT
Start: 2023-05-05 | End: 2023-06-28

## 2023-06-20 ENCOUNTER — TELEPHONE (OUTPATIENT)
Dept: FAMILY MEDICINE | Facility: CLINIC | Age: 73
End: 2023-06-20
Payer: COMMERCIAL

## 2023-06-20 NOTE — TELEPHONE ENCOUNTER
Reason for Call:  Appointment Request    Patient requesting this type of appt: Chronic Diease Management/Medication/Follow-Up    Requested provider: Eric Miller    Reason patient unable to be scheduled: Needs to be scheduled by clinic    When does patient want to be seen/preferred time: 1-2 weeks    Comments: patient is looking to change his medications/check in on how they are working.    Mornings work best for scheduling    Okay to leave a detailed message?: Yes at Cell number on file:    Telephone Information:   Mobile [phone]       Call taken on 6/20/2023 at 1:14 PM by [place]

## 2023-06-28 ENCOUNTER — VIRTUAL VISIT (OUTPATIENT)
Dept: FAMILY MEDICINE | Facility: CLINIC | Age: 73
End: 2023-06-28
Payer: COMMERCIAL

## 2023-06-28 DIAGNOSIS — G60.9 IDIOPATHIC PERIPHERAL NEUROPATHY: ICD-10-CM

## 2023-06-28 DIAGNOSIS — J30.89 NON-SEASONAL ALLERGIC RHINITIS, UNSPECIFIED TRIGGER: ICD-10-CM

## 2023-06-28 DIAGNOSIS — I10 BENIGN ESSENTIAL HYPERTENSION: Primary | ICD-10-CM

## 2023-06-28 PROCEDURE — 99214 OFFICE O/P EST MOD 30 MIN: CPT | Mod: 93 | Performed by: STUDENT IN AN ORGANIZED HEALTH CARE EDUCATION/TRAINING PROGRAM

## 2023-06-28 RX ORDER — GABAPENTIN 300 MG/1
300 CAPSULE ORAL 3 TIMES DAILY
Qty: 90 CAPSULE | Refills: 3 | Status: SHIPPED | OUTPATIENT
Start: 2023-06-28 | End: 2023-10-17

## 2023-06-28 RX ORDER — FLUTICASONE PROPIONATE 50 MCG
1 SPRAY, SUSPENSION (ML) NASAL DAILY
Qty: 18.2 ML | Refills: 1 | Status: SHIPPED | OUTPATIENT
Start: 2023-06-28 | End: 2023-11-29

## 2023-06-28 NOTE — PROGRESS NOTES
"Aung is a 72 year old who is being evaluated via a billable telephone visit.      What phone number would you like to be contacted at? [phone]  How would you like to obtain your AVS? Mail a copy    Distant Location (provider location):  On-site    Assessment & Plan     Benign essential hypertension  GERD  Blood pressure stable and on lisinopril for quite some time.  He has no lip or mouth swelling or respiratory symptoms but does note some swallowing difficulty at times with foods.  Does have reflux history.  We will plan to restart omeprazole consistently and see if this changes things.  If not I would like to follow-up in clinic for further evaluation.  Be seen sooner if lip or facial swelling.    Idiopathic peripheral neuropathy  Did not tolerate Lyrica and being off gabapentin did make him realize that this was helpful.  We will go back to the nightly gabapentin and increase to 300 mg along with Tylenol.  Follow-up as needed  - gabapentin (NEURONTIN) 300 MG capsule  Dispense: 90 capsule; Refill: 3    Non-seasonal allergic rhinitis, unspecified trigger  Ongoing sinus drainage and does have some sputum when he wakes up in the morning that may be from the drainage.  We will add Flonase and see if this helps improve things.    - fluticasone (FLONASE) 50 MCG/ACT nasal spray  Dispense: 18.2 mL; Refill: 1           BMI:   Estimated body mass index is 26.36 kg/m  as calculated from the following:    Height as of 2/10/23: 1.791 m (5' 10.5\").    Weight as of 2/10/23: 84.5 kg (186 lb 6 oz).     Eric Miller MD  [facility]   Aung is a 72 year old, presenting for the following health issues:  Recheck Medication        6/28/2023     7:58 AM   Additional Questions   Roomed by Kiana ARIZA   Accompanied by Self         6/28/2023     7:58 AM   Patient Reported Additional Medications   Patient reports taking the following new medications None     History of Present Illness "       Hypertension: He presents for follow up of hypertension.  He does not check blood pressure  regularly outside of the clinic. Outpatient blood pressures have not been over 140/90. He follows a low salt diet.     Reason for visit:  Discuss gabapentin, Lisinopril and coughing up green phlegm every am    He eats 0-1 servings of fruits and vegetables daily.He consumes 0 sweetened beverage(s) daily.He exercises with enough effort to increase his heart rate 20 to 29 minutes per day.  He exercises with enough effort to increase his heart rate 5 days per week.   He is taking medications regularly.     Patient is taking an old prescription of gabapentin to help him sleep at night.          Review of Systems   Constitutional, HEENT, cardiovascular, pulmonary, gi and gu systems are negative, except as otherwise noted.      Objective           Vitals:  No vitals were obtained today due to virtual visit.    Physical Exam   healthy, alert and no distress  PSYCH: Alert and oriented times 3; coherent speech, normal   rate and volume, able to articulate logical thoughts, able   to abstract reason, no tangential thoughts, no hallucinations   or delusions  His affect is normal  RESP: No cough, no audible wheezing, able to talk in full sentences  Remainder of exam unable to be completed due to telephone visits          8:30 to 8:40  Phone call duration: 10 minutes

## 2023-10-17 DIAGNOSIS — G60.9 IDIOPATHIC PERIPHERAL NEUROPATHY: ICD-10-CM

## 2023-10-17 RX ORDER — GABAPENTIN 300 MG/1
300 CAPSULE ORAL 3 TIMES DAILY
Qty: 90 CAPSULE | Refills: 3 | Status: SHIPPED | OUTPATIENT
Start: 2023-10-17 | End: 2024-04-17

## 2023-10-26 ASSESSMENT — ENCOUNTER SYMPTOMS
MYALGIAS: 0
FEVER: 0
PARESTHESIAS: 0
WEAKNESS: 0
FREQUENCY: 0
HEADACHES: 0
HEMATOCHEZIA: 0
PALPITATIONS: 0
ABDOMINAL PAIN: 0
CHILLS: 0
EYE PAIN: 0
HEMATURIA: 0
DIARRHEA: 0
HEARTBURN: 0
NERVOUS/ANXIOUS: 0
NAUSEA: 0
DIZZINESS: 0
CONSTIPATION: 0
SHORTNESS OF BREATH: 0
COUGH: 0
DYSURIA: 0
SORE THROAT: 0
ARTHRALGIAS: 0
JOINT SWELLING: 0

## 2023-10-26 ASSESSMENT — ACTIVITIES OF DAILY LIVING (ADL): CURRENT_FUNCTION: NO ASSISTANCE NEEDED

## 2023-11-02 ENCOUNTER — OFFICE VISIT (OUTPATIENT)
Dept: FAMILY MEDICINE | Facility: CLINIC | Age: 73
End: 2023-11-02
Payer: COMMERCIAL

## 2023-11-02 VITALS
RESPIRATION RATE: 16 BRPM | WEIGHT: 177.4 LBS | SYSTOLIC BLOOD PRESSURE: 136 MMHG | OXYGEN SATURATION: 97 % | DIASTOLIC BLOOD PRESSURE: 80 MMHG | TEMPERATURE: 97.7 F | HEIGHT: 70 IN | BODY MASS INDEX: 25.4 KG/M2 | HEART RATE: 72 BPM

## 2023-11-02 DIAGNOSIS — Z23 NEED FOR TDAP VACCINATION: ICD-10-CM

## 2023-11-02 DIAGNOSIS — I10 BENIGN ESSENTIAL HYPERTENSION: ICD-10-CM

## 2023-11-02 DIAGNOSIS — Z13.220 LIPID SCREENING: ICD-10-CM

## 2023-11-02 DIAGNOSIS — C61 MALIGNANT NEOPLASM OF PROSTATE (H): ICD-10-CM

## 2023-11-02 DIAGNOSIS — Z00.00 ENCOUNTER FOR MEDICARE ANNUAL WELLNESS EXAM: Primary | ICD-10-CM

## 2023-11-02 DIAGNOSIS — Z29.11 NEED FOR VACCINATION AGAINST RESPIRATORY SYNCYTIAL VIRUS: ICD-10-CM

## 2023-11-02 DIAGNOSIS — Z98.890 S/P ARTHROSCOPY OF RIGHT KNEE: ICD-10-CM

## 2023-11-02 DIAGNOSIS — Z13.6 ENCOUNTER FOR ABDOMINAL AORTIC ANEURYSM (AAA) SCREENING: ICD-10-CM

## 2023-11-02 DIAGNOSIS — G62.9 PERIPHERAL POLYNEUROPATHY: ICD-10-CM

## 2023-11-02 DIAGNOSIS — R73.03 PREDIABETES: ICD-10-CM

## 2023-11-02 DIAGNOSIS — L72.0 EPIDERMAL CYST: ICD-10-CM

## 2023-11-02 LAB
ALBUMIN SERPL BCG-MCNC: 4.4 G/DL (ref 3.5–5.2)
ALP SERPL-CCNC: 90 U/L (ref 40–129)
ALT SERPL W P-5'-P-CCNC: 19 U/L (ref 0–70)
ANION GAP SERPL CALCULATED.3IONS-SCNC: 13 MMOL/L (ref 7–15)
AST SERPL W P-5'-P-CCNC: 18 U/L (ref 0–45)
BILIRUB SERPL-MCNC: 0.9 MG/DL
BUN SERPL-MCNC: 13.7 MG/DL (ref 8–23)
CALCIUM SERPL-MCNC: 9.4 MG/DL (ref 8.8–10.2)
CHLORIDE SERPL-SCNC: 96 MMOL/L (ref 98–107)
CHOLEST SERPL-MCNC: 174 MG/DL
CREAT SERPL-MCNC: 1.19 MG/DL (ref 0.67–1.17)
DEPRECATED HCO3 PLAS-SCNC: 23 MMOL/L (ref 22–29)
EGFRCR SERPLBLD CKD-EPI 2021: 65 ML/MIN/1.73M2
GLUCOSE SERPL-MCNC: 117 MG/DL (ref 70–99)
HBA1C MFR BLD: 5.9 %
HDLC SERPL-MCNC: 45 MG/DL
LDLC SERPL CALC-MCNC: 99 MG/DL
NONHDLC SERPL-MCNC: 129 MG/DL
POTASSIUM SERPL-SCNC: 4.7 MMOL/L (ref 3.4–5.3)
PROT SERPL-MCNC: 7.7 G/DL (ref 6.4–8.3)
PSA SERPL DL<=0.01 NG/ML-MCNC: <0.01 NG/ML (ref 0–6.5)
SODIUM SERPL-SCNC: 132 MMOL/L (ref 135–145)
TRIGL SERPL-MCNC: 152 MG/DL

## 2023-11-02 PROCEDURE — 90677 PCV20 VACCINE IM: CPT | Performed by: STUDENT IN AN ORGANIZED HEALTH CARE EDUCATION/TRAINING PROGRAM

## 2023-11-02 PROCEDURE — 83036 HEMOGLOBIN GLYCOSYLATED A1C: CPT | Performed by: STUDENT IN AN ORGANIZED HEALTH CARE EDUCATION/TRAINING PROGRAM

## 2023-11-02 PROCEDURE — 36415 COLL VENOUS BLD VENIPUNCTURE: CPT | Performed by: STUDENT IN AN ORGANIZED HEALTH CARE EDUCATION/TRAINING PROGRAM

## 2023-11-02 PROCEDURE — 80061 LIPID PANEL: CPT | Performed by: STUDENT IN AN ORGANIZED HEALTH CARE EDUCATION/TRAINING PROGRAM

## 2023-11-02 PROCEDURE — G0103 PSA SCREENING: HCPCS | Performed by: STUDENT IN AN ORGANIZED HEALTH CARE EDUCATION/TRAINING PROGRAM

## 2023-11-02 PROCEDURE — 80053 COMPREHEN METABOLIC PANEL: CPT | Performed by: STUDENT IN AN ORGANIZED HEALTH CARE EDUCATION/TRAINING PROGRAM

## 2023-11-02 PROCEDURE — G0009 ADMIN PNEUMOCOCCAL VACCINE: HCPCS | Performed by: STUDENT IN AN ORGANIZED HEALTH CARE EDUCATION/TRAINING PROGRAM

## 2023-11-02 PROCEDURE — 99214 OFFICE O/P EST MOD 30 MIN: CPT | Mod: 25 | Performed by: STUDENT IN AN ORGANIZED HEALTH CARE EDUCATION/TRAINING PROGRAM

## 2023-11-02 PROCEDURE — G0439 PPPS, SUBSEQ VISIT: HCPCS | Performed by: STUDENT IN AN ORGANIZED HEALTH CARE EDUCATION/TRAINING PROGRAM

## 2023-11-02 RX ORDER — RESPIRATORY SYNCYTIAL VIRUS VACCINE 120MCG/0.5
0.5 KIT INTRAMUSCULAR ONCE
Qty: 1 EACH | Refills: 0 | Status: CANCELLED | OUTPATIENT
Start: 2023-11-02 | End: 2023-11-02

## 2023-11-02 ASSESSMENT — ENCOUNTER SYMPTOMS
CHILLS: 0
ABDOMINAL PAIN: 0
DIZZINESS: 0
HEADACHES: 0
NERVOUS/ANXIOUS: 0
WEAKNESS: 0
FEVER: 0
COUGH: 0
DIARRHEA: 0
HEARTBURN: 0
DYSURIA: 0
ARTHRALGIAS: 0
HEMATURIA: 0
MYALGIAS: 0
FREQUENCY: 0
PALPITATIONS: 0
SHORTNESS OF BREATH: 0
JOINT SWELLING: 0
CONSTIPATION: 0
EYE PAIN: 0
HEMATOCHEZIA: 0
PARESTHESIAS: 0
NAUSEA: 0
SORE THROAT: 0

## 2023-11-02 ASSESSMENT — ACTIVITIES OF DAILY LIVING (ADL): CURRENT_FUNCTION: NO ASSISTANCE NEEDED

## 2023-11-02 ASSESSMENT — PAIN SCALES - GENERAL: PAINLEVEL: NO PAIN (0)

## 2023-11-02 NOTE — NURSING NOTE
Prior to immunization administration, verified patients identity using patient s name and date of birth. Please see Immunization Activity for additional information.     Screening Questionnaire for Adult Immunization    Are you sick today?   No   Do you have allergies to medications, food, a vaccine component or latex?   No   Have you ever had a serious reaction after receiving a vaccination?   No   Do you have a long-term health problem with heart, lung, kidney, or metabolic disease (e.g., diabetes), asthma, a blood disorder, no spleen, complement component deficiency, a cochlear implant, or a spinal fluid leak?  Are you on long-term aspirin therapy?   No   Do you have cancer, leukemia, HIV/AIDS, or any other immune system problem?   No   Do you have a parent, brother, or sister with an immune system problem?   No   In the past 3 months, have you taken medications that affect  your immune system, such as prednisone, other steroids, or anticancer drugs; drugs for the treatment of rheumatoid arthritis, Crohn s disease, or psoriasis; or have you had radiation treatments?   No   Have you had a seizure, or a brain or other nervous system problem?   No   During the past year, have you received a transfusion of blood or blood    products, or been given immune (gamma) globulin or antiviral drug?   No   For women: Are you pregnant or is there a chance you could become       pregnant during the next month?   No   Have you received any vaccinations in the past 4 weeks?   Yes     Immunization questionnaire was positive for at least one answer.  Notified Dr. Miller.      Patient instructed to remain in clinic for 15 minutes afterwards, and to report any adverse reactions.     Screening performed by Hali Montgomery LPN on 11/2/2023 at 9:17 AM.      3

## 2023-11-02 NOTE — PATIENT INSTRUCTIONS
Patient Education   Personalized Prevention Plan  You are due for the preventive services outlined below.  Your care team is available to assist you in scheduling these services.  If you have already completed any of these items, please share that information with your care team to update in your medical record.  Health Maintenance Due   Topic Date Due     LUNG CANCER SCREENING  Never done     RSV VACCINE 60+ (1 - 1-dose 60+ series) Never done     AORTIC ANEURYSM SCREENING (SYSTEM ASSIGNED)  Never done     Pneumococcal Vaccine (2 - PPSV23 or PCV20) 03/30/2018     Diptheria Tetanus Pertussis (DTAP/TDAP/TD) Vaccine (2 - Td or Tdap) 04/20/2019     ANNUAL REVIEW OF HM ORDERS  10/27/2023     Learning About Dietary Guidelines  What are the Dietary Guidelines for Americans?     Dietary Guidelines for Americans provide tips for eating well and staying healthy. This helps reduce the risk for long-term (chronic) diseases.  These guidelines recommend that you:  Eat and drink the right amount for you. The U.[place] government's food guide is called MyPlate. It can help you make your own well-balanced eating plan.  Try to balance your eating with your activity. This helps you stay at a healthy weight.  Drink alcohol in moderation, if at all.  Limit foods high in salt, saturated fat, trans fat, and added sugar.  These guidelines are from the U.S. Department of Agriculture and the U.S. Department of Health and Human Services. They are updated every 5 years.  What is MyPlate?  MyPlate is the U.[place]. government's food guide. It can help you make your own well-balanced eating plan. A balanced eating plan means that you eat enough, but not too much, and that your food gives you the nutrients you need to stay healthy.  MyPlate focuses on eating plenty of whole grains, fruits, and vegetables, and on limiting fat and sugar. It is available online at www.ChooseMyPlate.gov.  How can you get started?  If you're trying to eat healthier, you can  "slowly change your eating habits over time. You don't have to make big changes all at once. Start by adding one or two healthy foods to your meals each day.  Grains  Choose whole-grain breads and cereals and whole-wheat pasta and whole-grain crackers.  Vegetables  Eat a variety of vegetables every day. They have lots of nutrients and are part of a heart-healthy diet.  Fruits  Eat a variety of fruits every day. Fruits contain lots of nutrients. Choose fresh fruit instead of fruit juice.  Protein foods  Choose fish and lean poultry more often. Eat red meat and fried meats less often. Dried beans, tofu, and nuts are also good sources of protein.  Dairy  Choose low-fat or fat-free products from this food group. If you have problems digesting milk, try eating cheese or yogurt instead.  Fats and oils  Limit fats and oils if you're trying to cut calories. Choose healthy fats when you cook. These include canola oil and olive oil.  Where can you learn more?  Go to https://www.Scoopshot.net/patiented  Enter D676 in the search box to learn more about \"Learning About Dietary Guidelines.\"  Current as of: March 1, 2023               Content Version: 13.7    [id number] [employer].   Care instructions adapted under license by your healthcare professional. If you have questions about a medical condition or this instruction, always ask your healthcare professional. [employer] disclaims any warranty or liability for your use of this information.      Hearing Loss: Care Instructions  Overview     Hearing loss is a sudden or slow decrease in how well you hear. It can range from slight to profound. Permanent hearing loss can occur with aging. It also can happen when you are exposed long-term to loud noise. Examples include listening to loud music, riding motorcycles, or being around other loud machines.  Hearing loss can affect your work and home life. It can make you feel lonely or depressed. You may feel " that you have lost your independence. But hearing aids and other devices can help you hear better and feel connected to others.  Follow-up care is a key part of your treatment and safety. Be sure to make and go to all appointments, and call your doctor if you are having problems. It's also a good idea to know your test results and keep a list of the medicines you take.  How can you care for yourself at home?  Avoid loud noises whenever possible. This helps keep your hearing from getting worse.  Always wear hearing protection around loud noises.  Wear a hearing aid as directed.  A professional can help you pick a hearing aid that will work best for you.  You can also get hearing aids over the counter for mild to moderate hearing loss.  Have hearing tests as your doctor suggests. They can show whether your hearing has changed. Your hearing aid may need to be adjusted.  Use other devices as needed. These may include:  Telephone amplifiers and hearing aids that can connect to a television, stereo, radio, or microphone.  Devices that use lights or vibrations. These alert you to the doorbell, a ringing telephone, or a baby monitor.  Television closed-captioning. This shows the words at the bottom of the screen. Most new TVs can do this.  TTY (text telephone). This lets you type messages back and forth on the telephone instead of talking or listening. These devices are also called TDD. When messages are typed on the keyboard, they are sent over the phone line to a receiving TTY. The message is shown on a monitor.  Use text messaging, social media, and email if it is hard for you to communicate by telephone.  Try to learn a listening technique called speechreading. It is not lipreading. You pay attention to people's gestures, expressions, posture, and tone of voice. These clues can help you understand what a person is saying. Face the person you are talking to, and have them face you. Make sure the lighting is good. You  "need to see the other person's face clearly.  Think about counseling if you need help to adjust to your hearing loss.  When should you call for help?  Watch closely for changes in your health, and be sure to contact your doctor if:    You think your hearing is getting worse.     You have new symptoms, such as dizziness or nausea.   Where can you learn more?  Go to https://www.Techpacker.net/patiented  Enter R798 in the search box to learn more about \"Hearing Loss: Care Instructions.\"  Current as of: March 1, 2023               Content Version: 13.7    5215-1752 [employer].   Care instructions adapted under license by your healthcare professional. If you have questions about a medical condition or this instruction, always ask your healthcare professional. [employer] disclaims any warranty or liability for your use of this information.         "

## 2023-11-29 ENCOUNTER — OFFICE VISIT (OUTPATIENT)
Dept: FAMILY MEDICINE | Facility: CLINIC | Age: 73
End: 2023-11-29
Payer: COMMERCIAL

## 2023-11-29 VITALS
HEART RATE: 84 BPM | TEMPERATURE: 98.3 F | SYSTOLIC BLOOD PRESSURE: 110 MMHG | DIASTOLIC BLOOD PRESSURE: 70 MMHG | OXYGEN SATURATION: 98 % | HEIGHT: 70 IN | WEIGHT: 179 LBS | RESPIRATION RATE: 12 BRPM | BODY MASS INDEX: 25.62 KG/M2

## 2023-11-29 DIAGNOSIS — J01.40 ACUTE NON-RECURRENT PANSINUSITIS: Primary | ICD-10-CM

## 2023-11-29 DIAGNOSIS — J30.89 NON-SEASONAL ALLERGIC RHINITIS, UNSPECIFIED TRIGGER: ICD-10-CM

## 2023-11-29 PROCEDURE — 99213 OFFICE O/P EST LOW 20 MIN: CPT | Performed by: NURSE PRACTITIONER

## 2023-11-29 RX ORDER — FLUTICASONE PROPIONATE 50 MCG
1 SPRAY, SUSPENSION (ML) NASAL DAILY
Qty: 18.2 ML | Refills: 1 | Status: SHIPPED | OUTPATIENT
Start: 2023-11-29

## 2023-11-29 RX ORDER — RESPIRATORY SYNCYTIAL VIRUS VACCINE 120MCG/0.5
0.5 KIT INTRAMUSCULAR ONCE
Qty: 1 EACH | Refills: 0 | Status: CANCELLED | OUTPATIENT
Start: 2023-11-29 | End: 2023-11-29

## 2023-11-29 ASSESSMENT — PAIN SCALES - GENERAL: PAINLEVEL: NO PAIN (0)

## 2023-11-29 NOTE — PROGRESS NOTES
Assessment & Plan     Acute non-recurrent pansinusitis    Given ongoing symptoms that are worsening, we discussed trying a course of antibiotics for his symptoms. Instructions for use and side effects reviewed. Encouraged to utilize supportive cares, nasal saline rinses, Mucinex and tylenol or ibuprofen as needed. If his symptoms do not improve, consider referral to ENT.     - amoxicillin-clavulanate (AUGMENTIN) 875-125 MG tablet; Take 1 tablet by mouth 2 times daily for 10 days    Non-seasonal allergic rhinitis, unspecified trigger  Encouraged to restart nasal spray, continuous use recommended x2 weeks at minimum. Continue daily antihistamine as well. Nasal saline rinses also encouraged.     - fluticasone (FLONASE) 50 MCG/ACT nasal spray; Spray 1 spray into both nostrils daily    I explained my diagnostic considerations and recommendations to the patient, who voiced understanding and agreement with the assessment and treatment plan. All questions were answered to patient's apparent satisfaction. We discussed potential side effects of any prescribed or recommended therapies, as well as expectations for response to treatments and importance of lifestyle measures that may improve symptoms. Patient was advised to contact our office if there are new symptoms or no improvement or worsening of conditions or symptoms.                 Jessa Fontenot NP  [facility] CHRISTOPHE Horan is a 73 year old, presenting for the following health issues:  URI      11/29/2023    12:47 PM   Additional Questions   Roomed by Lane Cevallos CMA       History of Present Illness       Reason for visit:  Coughing up phlem/mucus  Symptom onset:  1-2 weeks ago  Symptom intensity:  Severe  Symptom progression:  Worsening  Had these symptoms before:  Yes  Has tried/received treatment for these symptoms:  No  What makes it worse:  Musinex made the coughing worse  What makes it better:  No    He eats 0-1 servings of fruits  and vegetables daily.He consumes 0 sweetened beverage(s) daily.He exercises with enough effort to increase his heart rate 9 or less minutes per day.  He exercises with enough effort to increase his heart rate 3 or less days per week.   He is taking medications regularly.     Aung presents today with concerns of cough and post-nasal drainage. This is an ongoing problem for him. He reports he has had a cough and post-nasal drainage for several months. He has discussed this with his primary care provider in the past and was started on Flonase nasal spray which he used for about one week before stopping. He denies any known history of allergies, but has been taking an OTC allergy medication for allergies this fall. He has also tried taking Mucinex but felt this made his cough worse and increased the mucus he was coughing up. He has not been treated with antibiotics in recent months. He reports the mucus is green in color. He has not had any fever. He denies any chest pain or difficulty breathing.         Patient Active Problem List   Diagnosis    Disorder of bilirubin excretion    Malignant neoplasm of prostate (H)    Counseling regarding advanced directives    Benign essential hypertension    S/P arthroscopy of right knee     Current Outpatient Medications   Medication    amoxicillin-clavulanate (AUGMENTIN) 875-125 MG tablet    cetirizine (ZYRTEC) 10 MG tablet    fluticasone (FLONASE) 50 MCG/ACT nasal spray    gabapentin (NEURONTIN) 300 MG capsule    Ibuprofen-diphenhydrAMINE Cit (ADVIL PM) 200-38 MG TABS    lisinopril (ZESTRIL) 10 MG tablet    lidocaine-prilocaine (EMLA) 2.5-2.5 % external cream    omeprazole (PRILOSEC) 20 MG DR capsule     No current facility-administered medications for this visit.     Review of Systems   Constitutional, HEENT, cardiovascular, pulmonary, gi and gu systems are negative, except as otherwise noted.      Objective    /70 (BP Location: Left arm, Patient Position: Chair, Cuff  "Size: Adult Regular)   Pulse 84   Temp 98.3  F (36.8  C) (Temporal)   Resp 12   Ht 1.778 m (5' 10\")   Wt 81.2 kg (179 lb)   SpO2 98%   BMI 25.68 kg/m    Body mass index is 25.68 kg/m .  Physical Exam  Vitals reviewed.   Constitutional:       General: He is not in acute distress.     Appearance: Normal appearance.   HENT:      Head: Normocephalic and atraumatic.      Comments: Bilateral maxillary and frontal sinus tenderness on palpation     Right Ear: Tympanic membrane normal.      Left Ear: Tympanic membrane normal.      Nose: Congestion present. No rhinorrhea.      Mouth/Throat:      Mouth: Mucous membranes are moist.      Pharynx: Oropharynx is clear.   Eyes:      Extraocular Movements: Extraocular movements intact.      Conjunctiva/sclera: Conjunctivae normal.   Cardiovascular:      Rate and Rhythm: Normal rate and regular rhythm.      Heart sounds: Normal heart sounds.   Pulmonary:      Effort: Pulmonary effort is normal.      Breath sounds: Normal breath sounds.   Musculoskeletal:      Cervical back: Neck supple.   Lymphadenopathy:      Cervical: No cervical adenopathy.   Skin:     General: Skin is warm and dry.   Neurological:      Mental Status: He is alert and oriented to person, place, and time. Mental status is at baseline.   Psychiatric:         Mood and Affect: Mood normal.         Behavior: Behavior normal.                              "

## 2024-02-27 DIAGNOSIS — I10 BENIGN ESSENTIAL HYPERTENSION: ICD-10-CM

## 2024-03-01 RX ORDER — LISINOPRIL 10 MG/1
10 TABLET ORAL DAILY
Qty: 90 TABLET | Refills: 3 | Status: SHIPPED | OUTPATIENT
Start: 2024-03-01

## 2024-03-12 DIAGNOSIS — I10 BENIGN ESSENTIAL HYPERTENSION: ICD-10-CM

## 2024-03-12 RX ORDER — LISINOPRIL 10 MG/1
10 TABLET ORAL DAILY
Qty: 90 TABLET | Refills: 3 | OUTPATIENT
Start: 2024-03-12

## 2024-03-19 ENCOUNTER — OFFICE VISIT (OUTPATIENT)
Dept: PODIATRY | Facility: CLINIC | Age: 74
End: 2024-03-19
Payer: COMMERCIAL

## 2024-03-19 VITALS
HEIGHT: 70 IN | SYSTOLIC BLOOD PRESSURE: 136 MMHG | BODY MASS INDEX: 25.62 KG/M2 | HEART RATE: 87 BPM | WEIGHT: 179 LBS | DIASTOLIC BLOOD PRESSURE: 80 MMHG

## 2024-03-19 DIAGNOSIS — G62.9 PERIPHERAL POLYNEUROPATHY: Primary | ICD-10-CM

## 2024-03-19 DIAGNOSIS — S91.115A LACERATION OF LESSER TOE OF LEFT FOOT WITHOUT FOREIGN BODY PRESENT OR DAMAGE TO NAIL, INITIAL ENCOUNTER: ICD-10-CM

## 2024-03-19 DIAGNOSIS — M20.41 HAMMER TOES OF BOTH FEET: ICD-10-CM

## 2024-03-19 DIAGNOSIS — M20.42 HAMMER TOES OF BOTH FEET: ICD-10-CM

## 2024-03-19 PROCEDURE — 99204 OFFICE O/P NEW MOD 45 MIN: CPT | Performed by: PODIATRIST

## 2024-03-19 NOTE — PROGRESS NOTES
Subjective:    Pt is seen today with chief complaint of numbness and burning in both feet.  It is constant and always present.  Has had this for 5 years.  Slowly getting worse.  The pain is aggravated by activity.  Can also be bothersome in bed at night.  Nothing seems to relieve this.  Patient takes gabapentin.  Denies past history of foot ulcers.  Trimming nails and recently cut left second toe.  Denies erythema or drainage.  He is a former smoker.  He is retired.         ROS: See above         Allergies   Allergen Reactions    No Known Drug Allergy        Current Outpatient Medications   Medication Sig Dispense Refill    cetirizine (ZYRTEC) 10 MG tablet Take 10 mg by mouth daily      gabapentin (NEURONTIN) 300 MG capsule TAKE ONE CAPSULE BY MOUTH THREE TIMES A DAY 90 capsule 3    lisinopril (ZESTRIL) 10 MG tablet TAKE ONE TABLET BY MOUTH ONCE DAILY 90 tablet 3    fluticasone (FLONASE) 50 MCG/ACT nasal spray Spray 1 spray into both nostrils daily (Patient not taking: Reported on 3/19/2024) 18.2 mL 1    Ibuprofen-diphenhydrAMINE Cit (ADVIL PM) 200-38 MG TABS Take 1 tablet by mouth nightly as needed (Patient not taking: Reported on 3/19/2024)      lidocaine-prilocaine (EMLA) 2.5-2.5 % external cream Apply topically daily (Patient not taking: Reported on 6/28/2023) 30 g 1    omeprazole (PRILOSEC) 20 MG DR capsule Take 10 mg by mouth daily (Patient not taking: Reported on 11/29/2023)         Patient Active Problem List   Diagnosis    Disorder of bilirubin excretion    Malignant neoplasm of prostate (H)    Counseling regarding advanced directives    Benign essential hypertension    S/P arthroscopy of right knee       Past Medical History:   Diagnosis Date    Malignant neoplasm of prostate (H)     Meniere disease     Motion sickness     PONV (postoperative nausea and vomiting)        Past Surgical History:   Procedure Laterality Date    ARTHROSCOPY KNEE WITH DEBRIDEMENT JOINT, COMBINED Right 12/15/2016    Procedure:  "ARTHROSCOPY KNEE WITH DEBRIDEMENT JOINT;  Surgeon: Greg Morgan MD;  Location: [facility]    COLONOSCOPY N/A 8/27/2018    Procedure: COLONOSCOPY;  colonoscopy;  Surgeon: Esequiel Salazar MD;  Location: [facility]    HC INTERSTITIAL RAD SOURCE JESUS ALBERTO, INTERMEDIATE  1998    ganglian cyst    HC REMOVAL OF TONSILS,<12 Y/O      Tonsils <13y.o.    HC TYMPANOPLASTY W/O MASTOID, INIT/REV W/O OSS CHAIN RECONST  1991    [facility] REMV PROSTATE,RETROPUB,RADICAL  06/27/2007    [facility]  [facility]       Family History   Problem Relation Age of Onset    Hypertension Mother     Allergies Mother         seasonal    Anesthesia Reaction Mother         n and v    Arthritis Mother     Breast Cancer Mother     Eye Disorder Mother         cataract    Lipids Mother     Obesity Mother     Genitourinary Problems Father         kidneys stopped working, on dialaysis    Allergies Father         seasonal    Alcohol/Drug Father     Cerebrovascular Disease Paternal Grandmother        Social History     Tobacco Use    Smoking status: Former    Smokeless tobacco: Never    Tobacco comments:     quit in 1986   Substance Use Topics    Alcohol use: Yes     Comment: valeria         Exam:    Vitals: /80   Pulse 87   Ht 1.778 m (5' 10\")   Wt 81.2 kg (179 lb)   BMI 25.68 kg/m    BMI: Body mass index is 25.68 kg/m .  Height: 5' 10\"    Constitutional/ general:  Pt is in no apparent distress, appears well-nourished.  Cooperative with history and physical exam.     Psych:  The patient answered questions appropriately.  Normal affect.  Seems to have reasonable expectations, in terms of treatment.     Eyes:  Visual scanning/ tracking without deficit.     Ears:  Response to auditory stimuli is normal.  negative hearing aid devices.  Auricles in proper alignment.     Lymphatic:  Popliteal lymph nodes not enlarged.     Lungs:  Non labored breathing, non labored speech. No cough.  No audible wheezing. Even, quiet " breathing.       Vascular:  positive pedal pulses bilaterally for both the DP and PT arteries.  CFT < 3 sec.  Slight ankle edema  and varicosities noted.  negative pedal hair growth.    Neuro:  Alert and oriented x 3.  Muscle compartments intact.  Monofilament absent to midfoot bilateral.      Derm: Thin and shiny with no hair growth noted.    Musculoskeletal:    Lower extremity muscle strength is normal.  Patient is ambulatory without an assistive device or brace.  Lessor hammertoes noted bilateral left greater than right.  Can reduce these.    Normal arch with weightbearing.    Skin intact except for laceration and of left second toe.  This is healing.  No erythema edema ecchymosis or drainage.            Component  Ref Range & Units 4 mo ago 1 yr ago    Hemoglobin A1C  <5.7 % 5.9 High  5.8 High  CM               Component  Ref Range & Units 1 yr ago    Vitamin B12  232 - 1,245 pg/mL 372          A/P  Peripheral neuropathy with LOPS  Hammertoes, pain  Left second toe laceration    Explained causes of peripheral neuropathy.  Discussed this is probably idiopathic.  Reviewed recent labs.  Discussed he has loss of protective sensation.  Discussed importance of watching feet every day.  Will keep protected with shoes.  He has good circulation.  Discussed lacerations healing.  Will continue to watch this.  If he ever notices any sudden increase in edema erythema ecchymosis or drainage he needs to contact someone immediately.  Will try IcyHot to see if this helps with his pain.  Continue gabapentin.  RTC as needed.    Bashir Cr DPM, FACFAS

## 2024-03-19 NOTE — LETTER
3/19/2024         RE: Aung Sidhu  [address] [address] [place] [id number]        Dear Colleague,    Thank you for referring your patient, Aung Sidhu, to the [facility]. Please see a copy of my visit note below.    Subjective:    Pt is seen today with chief complaint of numbness and burning in both feet.  It is constant and always present.  Has had this for 5 years.  Slowly getting worse.  The pain is aggravated by activity.  Can also be bothersome in bed at night.  Nothing seems to relieve this.  Patient takes gabapentin.  Denies past history of foot ulcers.  Trimming nails and recently cut left second toe.  Denies erythema or drainage.  He is a former smoker.  He is retired.         ROS: See above         Allergies   Allergen Reactions     No Known Drug Allergy        Current Outpatient Medications   Medication Sig Dispense Refill     cetirizine (ZYRTEC) 10 MG tablet Take 10 mg by mouth daily       gabapentin (NEURONTIN) 300 MG capsule TAKE ONE CAPSULE BY MOUTH THREE TIMES A DAY 90 capsule 3     lisinopril (ZESTRIL) 10 MG tablet TAKE ONE TABLET BY MOUTH ONCE DAILY 90 tablet 3     fluticasone (FLONASE) 50 MCG/ACT nasal spray Spray 1 spray into both nostrils daily (Patient not taking: Reported on 3/19/2024) 18.2 mL 1     Ibuprofen-diphenhydrAMINE Cit (ADVIL PM) 200-38 MG TABS Take 1 tablet by mouth nightly as needed (Patient not taking: Reported on 3/19/2024)       lidocaine-prilocaine (EMLA) 2.5-2.5 % external cream Apply topically daily (Patient not taking: Reported on 6/28/2023) 30 g 1     omeprazole (PRILOSEC) 20 MG DR capsule Take 10 mg by mouth daily (Patient not taking: Reported on 11/29/2023)         Patient Active Problem List   Diagnosis     Disorder of bilirubin excretion     Malignant neoplasm of prostate (H)     Counseling regarding advanced directives     Benign essential hypertension     S/P arthroscopy of right knee       Past Medical History:  "  Diagnosis Date     Malignant neoplasm of prostate (H)      Meniere disease      Motion sickness      PONV (postoperative nausea and vomiting)        Past Surgical History:   Procedure Laterality Date     ARTHROSCOPY KNEE WITH DEBRIDEMENT JOINT, COMBINED Right 12/15/2016    Procedure: ARTHROSCOPY KNEE WITH DEBRIDEMENT JOINT;  Surgeon: Greg Morgan MD;  [facility]     COLONOSCOPY N/A 8/27/2018    Procedure: COLONOSCOPY;  colonoscopy;  Surgeon: Esequiel Salazar MD;  Location: [facility]     HC INTERSTITIAL RAD SOURCE JESUS ALBERTO, INTERMEDIATE  1998    ganglian cyst     HC REMOVAL OF TONSILS,<14 Y/O      Tonsils <13y.o.     HC TYMPANOPLASTY W/O MASTOID, INIT/REV W/O OSS CHAIN RECONST  1991     [facility] REMV PROSTATE,RETROPUB,RADICAL  06/27/2007    [facility]     ZZ COLONOSCOPY W/WO BRUSH/WASH  05/19/08       Family History   Problem Relation Age of Onset     Hypertension Mother      Allergies Mother         seasonal     Anesthesia Reaction Mother         n and v     Arthritis Mother      Breast Cancer Mother      Eye Disorder Mother         cataract     Lipids Mother      Obesity Mother      Genitourinary Problems Father         kidneys stopped working, on dialaysis     Allergies Father         seasonal     Alcohol/Drug Father      Cerebrovascular Disease Paternal Grandmother        Social History     Tobacco Use     Smoking status: Former     Smokeless tobacco: Never     Tobacco comments:     quit in 1986   Substance Use Topics     Alcohol use: Yes     Comment: valeria         Exam:    Vitals: /80   Pulse 87   Ht 1.778 m (5' 10\")   Wt 81.2 kg (179 lb)   BMI 25.68 kg/m    BMI: Body mass index is 25.68 kg/m .  Height: 5' 10\"    Constitutional/ general:  Pt is in no apparent distress, appears well-nourished.  Cooperative with history and physical exam.     Psych:  The patient answered questions appropriately.  Normal affect.  Seems to have reasonable expectations, in terms of treatment.     Eyes:  " Visual scanning/ tracking without deficit.     Ears:  Response to auditory stimuli is normal.  negative hearing aid devices.  Auricles in proper alignment.     Lymphatic:  Popliteal lymph nodes not enlarged.     Lungs:  Non labored breathing, non labored speech. No cough.  No audible wheezing. Even, quiet breathing.       Vascular:  positive pedal pulses bilaterally for both the DP and PT arteries.  CFT < 3 sec.  Slight ankle edema  and varicosities noted.  negative pedal hair growth.    Neuro:  Alert and oriented x 3.  Muscle compartments intact.  Monofilament absent to midfoot bilateral.      Derm: Thin and shiny with no hair growth noted.    Musculoskeletal:    Lower extremity muscle strength is normal.  Patient is ambulatory without an assistive device or brace.  Lessor hammertoes noted bilateral left greater than right.  Can reduce these.    Normal arch with weightbearing.    Skin intact except for laceration and of left second toe.  This is healing.  No erythema edema ecchymosis or drainage.            Component  Ref Range & Units 4 mo ago 1 yr ago    Hemoglobin A1C  <5.7 % 5.9 High  5.8 High  CM               Component  Ref Range & Units 1 yr ago    Vitamin B12  232 - 1,245 pg/mL 372          A/P  Peripheral neuropathy with LOPS  Hammertoes, pain  Left second toe laceration    Explained causes of peripheral neuropathy.  Discussed this is probably idiopathic.  Reviewed recent labs.  Discussed he has loss of protective sensation.  Discussed importance of watching feet every day.  Will keep protected with shoes.  He has good circulation.  Discussed lacerations healing.  Will continue to watch this.  If he ever notices any sudden increase in edema erythema ecchymosis or drainage he needs to contact someone immediately.  Will try IcyHot to see if this helps with his pain.  Continue gabapentin.  RTC as needed.    Bashir Cr, KALPESH, FACFAS          Again, thank you for allowing me to participate in the care of your  patient.        Sincerely,        Bashir Cr DPM

## 2024-03-19 NOTE — NURSING NOTE
"Chief Complaint   Patient presents with    Consult     Feet pain- neuropathy        Initial /80   Pulse 87   Ht 1.778 m (5' 10\")   Wt 81.2 kg (179 lb)   BMI 25.68 kg/m   Estimated body mass index is 25.68 kg/m  as calculated from the following:    Height as of this encounter: 1.778 m (5' 10\").    Weight as of this encounter: 81.2 kg (179 lb).  Medications and allergies reviewed.      Elvi HAMPTON MA    "

## 2024-04-16 DIAGNOSIS — G60.9 IDIOPATHIC PERIPHERAL NEUROPATHY: ICD-10-CM

## 2024-04-17 RX ORDER — GABAPENTIN 300 MG/1
300 CAPSULE ORAL 3 TIMES DAILY
Qty: 90 CAPSULE | Refills: 3 | Status: SHIPPED | OUTPATIENT
Start: 2024-04-17 | End: 2024-08-23

## 2024-08-22 DIAGNOSIS — G60.9 IDIOPATHIC PERIPHERAL NEUROPATHY: ICD-10-CM

## 2024-08-23 RX ORDER — GABAPENTIN 300 MG/1
300 CAPSULE ORAL 3 TIMES DAILY
Qty: 90 CAPSULE | Refills: 3 | Status: SHIPPED | OUTPATIENT
Start: 2024-08-23

## 2024-12-09 DIAGNOSIS — G60.9 IDIOPATHIC PERIPHERAL NEUROPATHY: ICD-10-CM

## 2024-12-11 RX ORDER — GABAPENTIN 300 MG/1
300 CAPSULE ORAL 3 TIMES DAILY
Qty: 270 CAPSULE | Refills: 3 | Status: SHIPPED | OUTPATIENT
Start: 2024-12-11

## 2025-01-06 ENCOUNTER — HOSPITAL ENCOUNTER (OUTPATIENT)
Dept: ULTRASOUND IMAGING | Facility: CLINIC | Age: 75
Discharge: HOME | End: 2025-01-06
Attending: STUDENT IN AN ORGANIZED HEALTH CARE EDUCATION/TRAINING PROGRAM | Admitting: STUDENT IN AN ORGANIZED HEALTH CARE EDUCATION/TRAINING PROGRAM
Payer: COMMERCIAL

## 2025-01-06 DIAGNOSIS — Z87.891 PERSONAL HISTORY OF NICOTINE DEPENDENCE: ICD-10-CM

## 2025-01-06 PROCEDURE — 76706 US ABDL AORTA SCREEN AAA: CPT

## 2025-02-13 DIAGNOSIS — G62.9 PERIPHERAL POLYNEUROPATHY: Primary | ICD-10-CM

## 2025-02-14 NOTE — TELEPHONE ENCOUNTER
Clinic RN: Please investigate patient's chart or contact patient if the information cannot be found because the medication is listed as historical or discontinued. Confirm patient is taking this medication. Document findings and route refill encounter to provider for approval or denial.    Madeline Busby, SUMANN, RN

## 2025-02-14 NOTE — TELEPHONE ENCOUNTER
"Refill request. Last office visit 12/27/24 gabapentin dose changed. \"Will increase gabapentin to 600 mg in the afternoon as his symptoms are worse than but does seem to respond to the medication regularly.\"    Will route to PCP for review.    Ginger Khoury RN on 2/14/2025 at 11:19 AM    "

## 2025-02-19 RX ORDER — GABAPENTIN 300 MG/1
CAPSULE ORAL
Qty: 360 CAPSULE | Refills: 4 | Status: SHIPPED | OUTPATIENT
Start: 2025-02-19

## (undated) RX ORDER — LIDOCAINE HYDROCHLORIDE 10 MG/ML
INJECTION, SOLUTION EPIDURAL; INFILTRATION; INTRACAUDAL; PERINEURAL
Status: DISPENSED
Start: 2018-08-27